# Patient Record
Sex: MALE | Race: WHITE | NOT HISPANIC OR LATINO | Employment: FULL TIME | URBAN - METROPOLITAN AREA
[De-identification: names, ages, dates, MRNs, and addresses within clinical notes are randomized per-mention and may not be internally consistent; named-entity substitution may affect disease eponyms.]

---

## 2019-07-26 ENCOUNTER — OFFICE VISIT (OUTPATIENT)
Dept: FAMILY MEDICINE | Facility: CLINIC | Age: 39
End: 2019-07-26
Payer: COMMERCIAL

## 2019-07-26 VITALS
SYSTOLIC BLOOD PRESSURE: 124 MMHG | HEART RATE: 72 BPM | BODY MASS INDEX: 36.96 KG/M2 | DIASTOLIC BLOOD PRESSURE: 94 MMHG | HEIGHT: 70 IN | TEMPERATURE: 98 F | WEIGHT: 258.19 LBS | OXYGEN SATURATION: 98 %

## 2019-07-26 DIAGNOSIS — Z98.84 HISTORY OF BARIATRIC SURGERY: ICD-10-CM

## 2019-07-26 DIAGNOSIS — R10.9 ABDOMINAL PAIN, UNSPECIFIED ABDOMINAL LOCATION: ICD-10-CM

## 2019-07-26 DIAGNOSIS — Z72.0 TOBACCO USE: Primary | ICD-10-CM

## 2019-07-26 DIAGNOSIS — K62.5 BRIGHT RED BLOOD PER RECTUM: ICD-10-CM

## 2019-07-26 DIAGNOSIS — Z87.39 PERSONAL HISTORY OF GOUT: ICD-10-CM

## 2019-07-26 DIAGNOSIS — R68.82 DECREASED LIBIDO: ICD-10-CM

## 2019-07-26 DIAGNOSIS — R12 HEARTBURN: ICD-10-CM

## 2019-07-26 DIAGNOSIS — F10.20 ALCOHOLISM: ICD-10-CM

## 2019-07-26 PROCEDURE — 99999 PR PBB SHADOW E&M-EST. PATIENT-LVL V: CPT | Mod: PBBFAC,,, | Performed by: FAMILY MEDICINE

## 2019-07-26 PROCEDURE — 99204 PR OFFICE/OUTPT VISIT, NEW, LEVL IV, 45-59 MIN: ICD-10-PCS | Mod: S$GLB,,, | Performed by: FAMILY MEDICINE

## 2019-07-26 PROCEDURE — 99204 OFFICE O/P NEW MOD 45 MIN: CPT | Mod: S$GLB,,, | Performed by: FAMILY MEDICINE

## 2019-07-26 PROCEDURE — 99999 PR PBB SHADOW E&M-EST. PATIENT-LVL V: ICD-10-PCS | Mod: PBBFAC,,, | Performed by: FAMILY MEDICINE

## 2019-07-26 RX ORDER — PANTOPRAZOLE SODIUM 40 MG/1
40 TABLET, DELAYED RELEASE ORAL DAILY
Qty: 30 TABLET | Refills: 11 | Status: ON HOLD | OUTPATIENT
Start: 2019-07-26 | End: 2020-07-25

## 2019-07-26 RX ORDER — VARENICLINE TARTRATE 0.5 (11)-1
KIT ORAL
Qty: 1 PACKAGE | Refills: 0 | Status: SHIPPED | OUTPATIENT
Start: 2019-07-26 | End: 2020-10-06

## 2019-07-26 NOTE — PROGRESS NOTES
Assessment and Plan:    1. Tobacco use  - varenicline (CHANTIX STARTING MONTH BOX) 0.5 mg (11)- 1 mg (42) tablet; Take one 0.5mg tab by mouth once daily X3 days,then increase to one 0.5mg tab twice daily X4 days,then increase to one 1mg tab twice daily  Dispense: 1 Package; Refill: 0    2. Heartburn  The history of heavy alcohol use, tobacco use and gastric sleeve, will recommend for upper endoscopy.  Discussed heavy alcohol use and cutting back.  - pantoprazole (PROTONIX) 40 MG tablet; Take 1 tablet (40 mg total) by mouth once daily.  Dispense: 30 tablet; Refill: 11  - Case request GI: COLONOSCOPY    3. History of bariatric surgery  - Urinalysis; Future  - Vitamin D; Future  - Vitamin B12; Future  - Ferritin; Future    4. Bright red blood per rectum  Has GI appointment on August 8th  - CBC auto differential; Future  - Case request GI: COLONOSCOPY    5. Abdominal pain, unspecified abdominal location  - Comprehensive metabolic panel; Future  - Lipid panel; Future  - US Abdomen Complete; Future    6. Alcoholism  Discussed multiple mechanisms of helping patient cut back.  He will try and cut back on his own.  - Magnesium; Future  - VITAMIN B1; Future    7. Personal history of gout  Recent flares likely secondary to alcohol use  - Uric acid; Future    8. Decreased libido  - Testosterone; Future        ______________________________________________________________________  Subjective:    Chief Complaint:  Chief Complaint   Patient presents with    Establish Care    Rectal Bleeding     Comes and goes past 6 months-- light to bold red blood    eating issues     Gastric sleeve 01/2013    cramping in abdomen     ribcage and midback    help to stop smoking     Would like Chantix    Heartburn     prescription for Prilosec        HPI:  Seymour is a 39 y.o. year old     Rectal bleeding  Quantity of blood varies from none to whole bowl redness. Frequency decreasing. No personal or family history of colon cancer or bleeding  "diathesis.     Abdominal discomfort, decreased appetite  "Can't eat or drink at the same time". Excessive belching. Has upcoming appt with GI on Aug 9. Reports bilateral flank cramping. He attributes this to dehydration. Will cluster for several days.     History of bariatric surgery, gastric sleeve January 2013.  Initially lost wt and has gained wt back (40 lbs over 2 years). Has poor diet. Not taking Vitamins.     Alcoholism  Drinking 3 pints of whiskey weekly, down from 1 pint daily.  No history of DUI, trouble with alcohol at work.  Uses alcohol to cope with stress at work.  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    Heartburn  Alcoholism + Tobacco Use + Gastric Sleeve    Fatigue  Patient enquiring on testosterone levels.  Reports decreased libido.    Tobacco use  Down to 2-3 cigs on days he does not drink and 1 pack when he does.     Gout  Dx at 20 years old. Last flare 4 months ago.     Past Medical History:  Past Medical History:   Diagnosis Date    Gout     History of bariatric surgery     Obesity     Tobacco use        Past Surgical History:  Past Surgical History:   Procedure Laterality Date    APPENDECTOMY      2012    Gastric sleeve  01/2013    VASECTOMY         Family History:  History reviewed. No pertinent family history.    Social History:  Social History     Socioeconomic History    Marital status: Single     Spouse name: Not on file    Number of children: Not on file    Years of education: Not on file    Highest education level: Not on file   Occupational History    Not on file   Social Needs    Financial resource strain: Not on file    Food insecurity:     Worry: Not on file     Inability: Not on file    Transportation needs:     Medical: Not on file     Non-medical: Not on file   Tobacco Use    Smoking status: Current Every Day Smoker    Smokeless tobacco: Never Used   Substance and Sexual Activity    Alcohol use: Not on " "file    Drug use: Not on file    Sexual activity: Not on file   Lifestyle    Physical activity:     Days per week: Not on file     Minutes per session: Not on file    Stress: Not on file   Relationships    Social connections:     Talks on phone: Not on file     Gets together: Not on file     Attends Oriental orthodox service: Not on file     Active member of club or organization: Not on file     Attends meetings of clubs or organizations: Not on file     Relationship status: Not on file   Other Topics Concern    Not on file   Social History Narrative    Not on file       Medications:  No current outpatient medications on file prior to visit.     No current facility-administered medications on file prior to visit.        Allergies:  Patient has no known allergies.    Immunizations:    There is no immunization history on file for this patient.    Review of Systems:  Review of Systems   Constitutional: Positive for appetite change. Negative for fever.   Respiratory: Negative for cough and shortness of breath.    Cardiovascular: Negative for chest pain.   Gastrointestinal: Positive for abdominal pain. Negative for diarrhea, nausea and vomiting.        Bright red blood per rectum  Heartburn   Skin: Negative for rash.   Psychiatric/Behavioral: Negative for dysphoric mood.       Objective:    Vitals:  Vitals:    07/26/19 0904   BP: (!) 124/94   Pulse: 72   Temp: 97.8 °F (36.6 °C)   TempSrc: Oral   SpO2: 98%   Weight: 117.1 kg (258 lb 2.5 oz)   Height: 5' 10" (1.778 m)       Physical Exam   Constitutional: No distress.   HENT:   Head: Normocephalic and atraumatic.   Eyes: Pupils are equal, round, and reactive to light. EOM are normal.   Neck: Neck supple.   Cardiovascular: Normal rate and regular rhythm. Exam reveals no friction rub.   No murmur heard.  Pulmonary/Chest: Effort normal and breath sounds normal.   Abdominal: Soft. Bowel sounds are normal. He exhibits no distension. There is no tenderness.   Mildly distended " abdomen.  Questionable liver enlargement on exam.  No caput medusa or spider telangiectasia.   Skin: Skin is warm and dry. No rash noted.        Psychiatric: He has a normal mood and affect. His behavior is normal.       Data:  Previous Notes reviewed and pertinent for Surgical procedures.        Rob Jovel MD  Family Medicine

## 2019-07-30 ENCOUNTER — HOSPITAL ENCOUNTER (OUTPATIENT)
Dept: RADIOLOGY | Facility: HOSPITAL | Age: 39
Discharge: HOME | End: 2019-07-30
Attending: FAMILY MEDICINE
Payer: COMMERCIAL

## 2019-07-30 DIAGNOSIS — R10.9 ABDOMINAL PAIN, UNSPECIFIED ABDOMINAL LOCATION: ICD-10-CM

## 2019-07-30 PROCEDURE — 76700 US ABDOMEN COMPLETE: ICD-10-PCS | Mod: 26,,, | Performed by: RADIOLOGY

## 2019-07-30 PROCEDURE — 76700 US EXAM ABDOM COMPLETE: CPT | Mod: 26,,, | Performed by: RADIOLOGY

## 2019-07-30 PROCEDURE — 76700 US EXAM ABDOM COMPLETE: CPT | Mod: TC,PO

## 2019-07-31 DIAGNOSIS — K76.0 FATTY LIVER: Primary | ICD-10-CM

## 2019-07-31 RX ORDER — ATORVASTATIN CALCIUM 40 MG/1
40 TABLET, FILM COATED ORAL DAILY
Qty: 90 TABLET | Refills: 3 | Status: SHIPPED | OUTPATIENT
Start: 2019-07-31 | End: 2020-10-06

## 2019-08-09 ENCOUNTER — OFFICE VISIT (OUTPATIENT)
Dept: GASTROENTEROLOGY | Facility: CLINIC | Age: 39
End: 2019-08-09
Payer: COMMERCIAL

## 2019-08-09 VITALS
DIASTOLIC BLOOD PRESSURE: 87 MMHG | BODY MASS INDEX: 37.07 KG/M2 | WEIGHT: 258.38 LBS | SYSTOLIC BLOOD PRESSURE: 132 MMHG

## 2019-08-09 DIAGNOSIS — R16.0 HEPATOMEGALY: ICD-10-CM

## 2019-08-09 DIAGNOSIS — Z87.19 HISTORY OF GASTROESOPHAGEAL REFLUX (GERD): ICD-10-CM

## 2019-08-09 DIAGNOSIS — F10.10 ALCOHOL ABUSE: ICD-10-CM

## 2019-08-09 DIAGNOSIS — K92.1 HEMATOCHEZIA: Primary | ICD-10-CM

## 2019-08-09 DIAGNOSIS — Z90.3 HISTORY OF SLEEVE GASTRECTOMY: ICD-10-CM

## 2019-08-09 DIAGNOSIS — R93.2 ABNORMAL GALLBLADDER ULTRASOUND: ICD-10-CM

## 2019-08-09 DIAGNOSIS — K76.0 FATTY LIVER: ICD-10-CM

## 2019-08-09 DIAGNOSIS — R79.89 ELEVATED LFTS: ICD-10-CM

## 2019-08-09 DIAGNOSIS — R13.14 PHARYNGOESOPHAGEAL DYSPHAGIA: ICD-10-CM

## 2019-08-09 PROCEDURE — 99999 PR PBB SHADOW E&M-EST. PATIENT-LVL IV: ICD-10-PCS | Mod: PBBFAC,,, | Performed by: NURSE PRACTITIONER

## 2019-08-09 PROCEDURE — 99999 PR PBB SHADOW E&M-EST. PATIENT-LVL IV: CPT | Mod: PBBFAC,,, | Performed by: NURSE PRACTITIONER

## 2019-08-09 PROCEDURE — 99243 PR OFFICE CONSULTATION,LEVEL III: ICD-10-PCS | Mod: S$GLB,,, | Performed by: NURSE PRACTITIONER

## 2019-08-09 PROCEDURE — 99243 OFF/OP CNSLTJ NEW/EST LOW 30: CPT | Mod: S$GLB,,, | Performed by: NURSE PRACTITIONER

## 2019-08-09 RX ORDER — PREDNISONE 20 MG/1
TABLET ORAL
Refills: 0 | COMMUNITY
Start: 2019-05-17 | End: 2020-10-06

## 2019-08-09 NOTE — PATIENT INSTRUCTIONS
"GERD (Adult)    The esophagus is a tube that carries food from the mouth to the stomach. A valve at the lower end of the esophagus prevents stomach acid from flowing upward. When this valve doesn't work properly, stomach contents may repeatedly flow back up (reflux) into the esophagus. This is called gastroesophageal reflux disease (GERD). GERD can irritate the esophagus. It can cause problems with swallowing or breathing. In severe cases, GERD can cause recurrent pneumonia or other serious problems.  Symptoms of reflux include burning, pressure or sharp pain in the upper abdomen or mid to lower chest. The pain can spread to the neck, back, or shoulder. There may be belching, an acid taste in the back of the throat, chronic cough, or sore throat or hoarseness. GERD symptoms often occur during the day after a big meal. They can also occur at night when lying down.   Home care  Lifestyle changes can help reduce symptoms. If needed, medicines may be prescribed. Symptoms often improve with treatment, but if treatment is stopped, the symptoms often return after a few months. So most persons with GERD will need to continue treatment.  Lifestyle changes  · Limit or avoid fatty, fried, and spicy foods, as well as coffee, chocolate, mint, and foods with high acid content such as tomatoes and citrus fruit and juices (orange, grapefruit, lemon).  · Dont eat large meals, especially at night. Frequent, smaller meals are best. Do not lie down right after eating. And dont eat anything 3 hours before going to bed.  · Avoid drinking alcohol and smoking. As much as possible, stay away from second hand smoke.  · If you are overweight, losing weight will reduce symptoms.   · Avoid wearing tight clothing around your stomach area.  · If your symptoms occur during sleep, use a foam wedge to elevate your upper body (not just your head.) Or, place 4" blocks under the head of your bed.  Medicines  If needed, medicines can help relieve the " symptoms of GERD and prevent damage to the esophagus. Discuss a medicine plan with your healthcare provider. This may include one or more of the following medicines:  · Antacids to help neutralize the normal acids in your stomach.  · Acid blockers (H2 blockers) to decrease acid production.  · Acid inhibitors (PPIs) to decrease acid production in a different way than the blockers. They may work better, but can take a little longer to take effect.  Take an antacid 30-60 minutes after eating and at bedtime, but not at the same time as an acid blocker.  Try not to take medicines such as ibuprofen and aspirin. If you are taking aspirin for your heart or other medical reasons, talk to your healthcare provider about stopping it.  Follow-up care  Follow up with your healthcare provider or as advised by our staff.  When to seek medical advice  Call your healthcare provider if any of the following occur:  · Stomach pain gets worse or moves to the lower right abdomen (appendix area)  · Chest pain appears or gets worse, or spreads to the back, neck, shoulder, or arm  · Frequent vomiting (cant keep down liquids)  · Blood in the stool or vomit (red or black in color)  · Feeling weak or dizzy  · Fever of 100.4ºF (38ºC) or higher, or as directed by your healthcare provider  Date Last Reviewed: 6/23/2015 © 2000-2017 [employer]. [address] [place] [address], [place] [address]. All rights reserved. This information is not intended as a substitute for professional medical care. Always follow your healthcare professional's instructions.          Discharge Instructions: Eating a Soft Diet  You have been prescribed a soft diet (also called gastrointestinal soft diet or bland diet). This reduces the amount of work your digestive tract has to do. It also reduces the chance that your digestive tract will be irritated by the food you eat. A soft diet is prescribed for people with digestive problems. The diet consists of foods that  are tender, mildly seasoned, and easy to digest. While on this diet, you should not eat fried or spicy foods, or raw fruits and vegetables. Also avoid alcoholic beverages.  General guidelines  · Eat in a calm, relaxed atmosphere. How you eat may be as important as what you eat. Dont rush while eating. Chew your food slowly and thoroughly, and swallow slowly.  · Eat small frequent meals throughout the day, but dont eat within 2 hours of bedtime.  · Avoid any foods that cause discomfort.  · Dont use NSAIDs (nonsteroidal anti-inflammatory drugs), such as aspirin, and ibuprofen. Also avoid medicine that contain aspirin. NSAIDs can cause ulcers and delay or prevent ulcer healing.  · Use antacids as needed, but keep in mind that magnesium-containing antacids may cause diarrhea.  Foods to eat  · Cream of wheat and cream of rice  · Cooked white rice  · Mashed potatoes, and boiled potatoes without skin  · Plain pasta and noodles  · Plain white crackers (such as no-salt soda crackers)  · White bread  · Applesauce  · Cooked fruits without skins or seeds  · Mild juices, such as apple and grape  · Bananas  · Cooked or mashed vegetables without stems and seeds  ? Carrots  ? Summer squash (zucchini, yellow squash)  ? Winter squash (acorn, butternut, spaghetti squash)  · Cottage cheese  · Mild hard or soft cheeses  · Custard  · Yogurt without seeds or nuts  · Milk (you may need lactose-free milk)  · Ice cream without seeds or nuts  · Smooth peanut butter  · Eggs  · Fish, turkey, chicken, or other meat that is not tough or stringy  · Tofu  Foods to avoid  · Nuts and seeds  · Snack foods, such as the following:  ? Chocolate-containing snacks, candy, pastries, or cakes.  ? Potato chips (plain, barbecued, or other flavors)  ? Taco chips or nachos  ? Corn chips  ? Popcorn, popcorn cakes, or rice cakes  ? Crackers with nuts, seeds, or spicy seasonings  ? French fries  · Fried or greasy foods  · Whole-grain breads, rolls, and  crackers  · Breads and rolls with nuts, seeds, or bran  · Bran and granola cereals  · Berries with seeds, such as strawberries, raspberries, and blackberries  · Acidic fruits, such as oranges, grapefruits, rj, limes, and pineapples  · Raw vegetables  · Mild or hot peppers  · Sauerkraut and pickled vegetables  · Tomatoes or tomato products, such as tomato paste, tomato sauce, and tomato juice  · Barbecue sauce  · Spicy or flavored cheeses, such as jalapeño and black pepper cheese  · Crunchy peanut butter  · Dried cooked beans, such as álvarez, kidney, or navy beans  · The following meats:  ? Fried or greasy meats  ? Processed, spicy meats, such as sausage, silva, ham, and lunch meats  ? Ribs and other meats with barbecue sauce  ? Tough or stringy meats, such as corned beef or beef jerky  Fluids to avoid  · Alcoholic beverages  · Coffee and regular teas  · Yossi and other drinks with caffeine  · Cranberry, orange, pineapple, and grapefruit juice  · Lemonade  · Vegetable juice  · Whole milk, if you are lactose intolerant  Follow-up  Make a follow-up appointment with a dietitian as directed by our staff.  Date Last Reviewed: 6/21/2015 © 2000-2017 [employer]. [address] [place] [address]. All rights reserved. This information is not intended as a substitute for professional medical care. Always follow your healthcare professional's instructions.

## 2019-08-09 NOTE — PROGRESS NOTES
Subjective:       Patient ID: Seymour Park is a 39 y.o. male Body mass index is 37.07 kg/m².    Chief Complaint: GI Problem (rectal bleeding & dysphagia)    This patient is new to me.  Referred by Dr. Jovel for rectal bleeding & colonoscopy.    GI Problem   The primary symptoms include hematochezia. Primary symptoms do not include fever, weight loss, fatigue, abdominal pain, nausea, vomiting, diarrhea, melena or dysuria.   The hematochezia began more than 1 week ago. Frequency: intermittent; has been several weeks since last episode; small to moderate amount of bright red blood in bowl & on tissue; denies bleeding in between bowel movements. The hematochezia is a recurrent (intermittent over the past few years) problem.   The illness is also significant for dysphagia (since sleeve can't eat and drink at the same time, feels like food stays in esophagus (can't have soup since it is solid and liquid together)). The illness does not include chills, odynophagia or constipation. Associated symptoms comments: Bowel movements once daily of formed stool. Significant associated medical issues include GERD (controlled on protonix 40 mg daily started ~7/26/19; if he misses a dose he has GERD; PAST: prilosec), gallstones, liver disease (fatty enlarged liver) and alcohol abuse. Associated medical issues do not include inflammatory bowel disease.     Review of Systems   Constitutional: Negative for appetite change, chills, fatigue, fever and weight loss.   HENT: Positive for trouble swallowing. Negative for sore throat.    Respiratory: Negative for cough, choking and shortness of breath.    Cardiovascular: Negative for chest pain.   Gastrointestinal: Positive for anal bleeding, dysphagia (since sleeve can't eat and drink at the same time, feels like food stays in esophagus (can't have soup since it is solid and liquid together)) and hematochezia. Negative for abdominal pain, blood in stool, constipation, diarrhea, melena, nausea,  rectal pain and vomiting.   Genitourinary: Negative for difficulty urinating, dysuria and flank pain.   Neurological: Negative for weakness.       Past Medical History:   Diagnosis Date    Cholelithiasis     Fatty liver     Gout     Hepatomegaly     History of bariatric surgery     Obesity     Tobacco use      Past Surgical History:   Procedure Laterality Date    APPENDECTOMY  ~    Gastric sleeve  2013    VASECTOMY       Family History   Problem Relation Age of Onset    Colon cancer Neg Hx     Crohn's disease Neg Hx     Esophageal cancer Neg Hx     Stomach cancer Neg Hx     Liver cancer Neg Hx     Liver disease Neg Hx       Social History     Tobacco Use    Smoking status: Former Smoker     Types: Cigarettes     Last attempt to quit: 2019     Years since quittin.0    Smokeless tobacco: Never Used   Substance Use Topics    Alcohol use: Not Currently     Comment: 3 pints per week - whiskey; stopped drinking ~19    Drug use: Not Currently     Comment: marijuana in college      Wt Readings from Last 10 Encounters:   19 117.2 kg (258 lb 6.1 oz)   19 117.1 kg (258 lb 2.5 oz)     Lab Results   Component Value Date    WBC 6.05 2019    HGB 15.3 2019    HCT 46.3 2019    MCV 83 2019     2019     CMP  Sodium   Date Value Ref Range Status   2019 140 136 - 145 mmol/L Final     Potassium   Date Value Ref Range Status   2019 4.7 3.5 - 5.1 mmol/L Final     Chloride   Date Value Ref Range Status   2019 105 95 - 110 mmol/L Final     CO2   Date Value Ref Range Status   2019 29 23 - 29 mmol/L Final     Glucose   Date Value Ref Range Status   2019 100 70 - 110 mg/dL Final     BUN, Bld   Date Value Ref Range Status   2019 15 6 - 20 mg/dL Final     Creatinine   Date Value Ref Range Status   2019 1.0 0.5 - 1.4 mg/dL Final     Calcium   Date Value Ref Range Status   2019 9.8 8.7 - 10.5 mg/dL Final      Total Protein   Date Value Ref Range Status   07/30/2019 7.5 6.0 - 8.4 g/dL Final     Albumin   Date Value Ref Range Status   07/30/2019 3.9 3.5 - 5.2 g/dL Final     Total Bilirubin   Date Value Ref Range Status   07/30/2019 0.8 0.1 - 1.0 mg/dL Final     Comment:     For infants and newborns, interpretation of results should be based  on gestational age, weight and in agreement with clinical  observations.  Premature Infant recommended reference ranges:  Up to 24 hours.............<8.0 mg/dL  Up to 48 hours............<12.0 mg/dL  3-5 days..................<15.0 mg/dL  6-29 days.................<15.0 mg/dL       Alkaline Phosphatase   Date Value Ref Range Status   07/30/2019 90 55 - 135 U/L Final     AST   Date Value Ref Range Status   07/30/2019 84 (H) 10 - 40 U/L Final     ALT   Date Value Ref Range Status   07/30/2019 160 (H) 10 - 44 U/L Final     Anion Gap   Date Value Ref Range Status   07/30/2019 6 (L) 8 - 16 mmol/L Final     eGFR if    Date Value Ref Range Status   07/30/2019 >60.0 >60 mL/min/1.73 m^2 Final     eGFR if non    Date Value Ref Range Status   07/30/2019 >60.0 >60 mL/min/1.73 m^2 Final     Comment:     Calculation used to obtain the estimated glomerular filtration  rate (eGFR) is the CKD-EPI equation.        Reviewed prior medical records including radiology report of 7/30/19 abdominal ultrasound.    Objective:      Physical Exam   Constitutional: He is oriented to person, place, and time. He appears well-developed and well-nourished. No distress.   HENT:   Mouth/Throat: Oropharynx is clear and moist and mucous membranes are normal. No oral lesions. No oropharyngeal exudate.   Eyes: Pupils are equal, round, and reactive to light. Conjunctivae are normal. No scleral icterus.   Pulmonary/Chest: Effort normal and breath sounds normal. No respiratory distress. He has no wheezes.   Abdominal: Soft. Normal appearance and bowel sounds are normal. He exhibits no  distension, no abdominal bruit and no mass. There is no tenderness. There is no rigidity, no rebound, no guarding, no tenderness at McBurney's point and negative Dejesus's sign.   Well-healed surgical scars noted. Patient declined rectal exam.   Neurological: He is alert and oriented to person, place, and time.   Skin: Skin is warm and dry. No rash noted. He is not diaphoretic. No erythema. No pallor.   Non-jaundiced   Psychiatric: He has a normal mood and affect. His behavior is normal. Judgment and thought content normal.   Nursing note and vitals reviewed.      Assessment:       1. Hematochezia    2. Pharyngoesophageal dysphagia    3. History of sleeve gastrectomy    4. Elevated LFTs    5. Alcohol abuse    6. Fatty liver    7. Hepatomegaly    8. Abnormal gallbladder ultrasound        Plan:       Hematochezia  - discussed about different etiologies that can cause rectal bleeding, such as but not limited to diverticulosis, polyps, colon mass, colon inflammation or infection, anal fissure or hemorrhoids.   - schedule Colonoscopy, discussed procedure with the patient, verbalized understanding   - You may resume normal activity as long as you feel well.  - Avoid/minimize aspirin and anti-inflammatory drugs such as ibuprofen (Advil, Motrin) and naproxen (Aleve and Naprosyn).  - Avoid alcohol.    Pharyngoesophageal dysphagia  - schedule EGD, discussed procedure with patient and possible esophageal dilation may be performed during procedure if indicated, patient verbalized understanding  - educated patient to eat smaller more frequent meals and to eat slowly and advised to eat a soft diet.  - possible UGI/esophagram/esophageal manometry if symptoms persist    History of GERD  - CONTINUE PROTONIX 40 MG DAILY AS DIRECTED, take in the morning 30-60 minutes before breakfast, discussed about possible long term use of medication (prefer to use lowest effective dose or discontinuing if possible) and discussed the risks &  benefits with taking a reflux medication long term, and to take OTC calcium and vitamin d supplements as directed (such as Citracal +D), pt verbalized understanding  -Educated patient on lifestyle modifications to help control/reduce reflux/abdominal pain including: avoid large meals, avoid eating within 2-3 hours of bedtime (avoid late night eating & lying down soon after eating), elevate head of bed if nocturnal symptoms are present, smoking cessation (if current smoker), & weight loss (if overweight).   -Educated to avoid known foods which trigger reflux symptoms & to minimize/avoid high-fat foods, chocolate, caffeine, citrus, alcohol, & tomato products.  -Advised to avoid/limit use of NSAID's, since they can cause GI upset, bleeding, and/or ulcers. If needed, take with food.   - schedule EGD, discussed procedure with patient, including risks and benefits, patient verbalized understanding    History of sleeve gastrectomy  - schedule EGD, discussed procedure with patient, including risks and benefits, patient verbalized understanding    Elevated LFTs, Alcohol abuse, Hepatomegaly, & Fatty liver  For fatty liver recommend: low fat, low cholesterol diet, maintain good control of blood sugars and cholesterol levels, exercise, weight loss (if overweight), minimize/avoid alcohol and tylenol products, & follow-up with PCP for continued evaluation and management; if specialist is needed, recommend seeing hepatology.    Abnormal gallbladder ultrasound  - recommend low fat diet  - recommend seeing general surgery for further evaluation and management, patient verbalized understanding    Follow up in about 1 month (around 9/9/2019), or if symptoms worsen or fail to improve.      If no improvement in symptoms or symptoms worsen, call/follow-up at clinic or go to ER.

## 2019-08-09 NOTE — LETTER
August 14, 2019        Rob Jovel MD  [address]  [place] [address]             [facility]  [address]  [facility] [address]  Phone: [phone]   Patient: Seymour Park   MR Number: [id number]   YOB: 1980   Date of Visit: 8/9/2019       Dear Dr. Jovel:    Thank you for referring Seymour Park to me for evaluation. Below are the relevant portions of my assessment and plan of care.        1. Hematochezia    2. Pharyngoesophageal dysphagia    3. History of sleeve gastrectomy    4. Elevated LFTs    5. Alcohol abuse    6. Fatty liver    7. Hepatomegaly    8. Abnormal gallbladder ultrasound          Hematochezia    Pharyngoesophageal dysphagia    History of sleeve gastrectomy    Elevated LFTs    Alcohol abuse    Fatty liver    Hepatomegaly    Abnormal gallbladder ultrasound      Follow up in about 1 month (around 9/9/2019), or if symptoms worsen or fail to improve.      If you have questions, please do not hesitate to call me. I look forward to following Seymour along with you.    Sincerely,      Pamela Holt, LANG           CC  No Recipients

## 2019-08-15 ENCOUNTER — TELEPHONE (OUTPATIENT)
Dept: SURGERY | Facility: CLINIC | Age: 39
End: 2019-08-15

## 2019-08-15 NOTE — TELEPHONE ENCOUNTER
I called patient to schedule a consult for gallstones per LANG Santos and patient states that he'll have to wait till next year due to his insurance.  I informed him that if he starts to have pain or needs to be seen sooner to call for an appointment.  I informed him that I'd let LANG Santos know.  Mickey

## 2019-09-18 ENCOUNTER — TELEPHONE (OUTPATIENT)
Dept: GASTROENTEROLOGY | Facility: CLINIC | Age: 39
End: 2019-09-18

## 2019-09-18 NOTE — TELEPHONE ENCOUNTER
----- Message from Kiran Ndiaye sent at 9/18/2019  1:56 PM CDT -----  Contact: pt   Pt would like a call back regarding canceling appointment due to insurance reason     Pt can be reached at  [phone]

## 2020-03-03 ENCOUNTER — TELEPHONE (OUTPATIENT)
Dept: GASTROENTEROLOGY | Facility: CLINIC | Age: 40
End: 2020-03-03

## 2020-03-03 NOTE — TELEPHONE ENCOUNTER
----- Message from Luis Carlos Rachel sent at 3/3/2020  8:27 AM CST -----  Contact: pt wife China  Type:  Sooner Apoointment Request    Caller is requesting a sooner appointment.  Caller declined first available appointment listed below.  Caller will not accept being placed on the waitlist and is requesting a message be sent to doctor.    Name of Caller:  China    Luther Call Back Number:  [phone]  Additional Information:  Pt was to schedule an endoscopy and colonoscopy back in October but did not. Pt is now requesting to get this scheduled. Please call to advise

## 2020-03-03 NOTE — TELEPHONE ENCOUNTER
Scheduled colonoscopy and EGD, mailed confirmation and instructions to address on file. Pt verbalized understanding.

## 2020-05-07 DIAGNOSIS — Z01.812 PRE-PROCEDURE LAB EXAM: Primary | ICD-10-CM

## 2020-05-08 ENCOUNTER — TELEPHONE (OUTPATIENT)
Dept: GASTROENTEROLOGY | Facility: CLINIC | Age: 40
End: 2020-05-08

## 2020-05-08 NOTE — TELEPHONE ENCOUNTER
Called to inform patient that he is scheduled for this COVID test. Pt states that he is going back to work on the date of his procedure and will need to reschedule. Pt informed that he will receive a call from the office to reschedule. Pt verbalized understanding.

## 2020-05-12 ENCOUNTER — TELEPHONE (OUTPATIENT)
Dept: GASTROENTEROLOGY | Facility: CLINIC | Age: 40
End: 2020-05-12

## 2020-05-12 NOTE — TELEPHONE ENCOUNTER
R/s colonoscopy and EGD, mailed confirmation and instructions to address on file. Pt verbalized understanding.

## 2020-06-16 ENCOUNTER — TELEPHONE (OUTPATIENT)
Dept: GASTROENTEROLOGY | Facility: CLINIC | Age: 40
End: 2020-06-16

## 2020-06-16 NOTE — TELEPHONE ENCOUNTER
----- Message from Elizabeth Camilo LPN sent at 6/15/2020  5:00 PM CDT -----  Regarding: FW: Requesting call back    ----- Message -----  From: Lexus Jimenez  Sent: 6/15/2020   4:19 PM CDT  To: Jeffrey MCCANN Jr. Staff  Subject: Requesting call back                             Type: Needs medical advice      Who Called: Pt wife China Sloan Call Back Number:   [phone]  Additional Information:  Pt wife stated pt want to r/s his COLONOSCOPY to sometime in July. Please advise

## 2020-06-16 NOTE — TELEPHONE ENCOUNTER
Rescheduled pt EGD and colonoscopy, mailed confirmation and instructions to address on file. Pt's wife verbalized understanding.

## 2020-08-06 DIAGNOSIS — Z01.812 PRE-PROCEDURE LAB EXAM: ICD-10-CM

## 2020-08-10 ENCOUNTER — TELEPHONE (OUTPATIENT)
Dept: GASTROENTEROLOGY | Facility: CLINIC | Age: 40
End: 2020-08-10

## 2020-08-11 ENCOUNTER — LAB VISIT (OUTPATIENT)
Dept: FAMILY MEDICINE | Facility: CLINIC | Age: 40
End: 2020-08-11
Payer: COMMERCIAL

## 2020-08-11 DIAGNOSIS — Z01.812 PRE-PROCEDURE LAB EXAM: ICD-10-CM

## 2020-08-11 PROCEDURE — U0003 INFECTIOUS AGENT DETECTION BY NUCLEIC ACID (DNA OR RNA); SEVERE ACUTE RESPIRATORY SYNDROME CORONAVIRUS 2 (SARS-COV-2) (CORONAVIRUS DISEASE [COVID-19]), AMPLIFIED PROBE TECHNIQUE, MAKING USE OF HIGH THROUGHPUT TECHNOLOGIES AS DESCRIBED BY CMS-2020-01-R: HCPCS

## 2020-08-11 NOTE — H&P
History & Physical - Short Stay  Gastroenterology      SUBJECTIVE:     Procedure: Gastroscopy and Colonoscopy    Chief Complaint/Indication for Procedure:  Dysphagia.  Hematochezia.    History of Present Illness:  Office Visit    8/9/2019  [facility]     LANG Gutierrez  Gastroenterology  Hematochezia +8 more  Dx  GI Problem   ; Referred by Aaareferral Self  Reason for Visit   Progress Notes  LANG Gutierrez (Nurse Practitioner)   Gastroenterology   8/9/2019 11:30 AM   Signed     Subjective:       Patient ID: Seymour Park is a 39 y.o. male Body mass index is 37.07 kg/m².     Chief Complaint: GI Problem (rectal bleeding & dysphagia)    This patient is new to me.  Referred by Dr. Jovel for rectal bleeding & colonoscopy.     GI Problem   The primary symptoms include hematochezia. Primary symptoms do not include fever, weight loss, fatigue, abdominal pain, nausea, vomiting, diarrhea, melena or dysuria.   The hematochezia began more than 1 week ago. Frequency: intermittent; has been several weeks since last episode; small to moderate amount of bright red blood in bowl & on tissue; denies bleeding in between bowel movements. The hematochezia is a recurrent (intermittent over the past few years) problem.   The illness is also significant for dysphagia (since sleeve can't eat and drink at the same time, feels like food stays in esophagus (can't have soup since it is solid and liquid together)). The illness does not include chills, odynophagia or constipation. Associated symptoms comments: Bowel movements once daily of formed stool. Significant associated medical issues include GERD (controlled on protonix 40 mg daily started ~7/26/19; if he misses a dose he has GERD; PAST: prilosec), gallstones, liver disease (fatty enlarged liver) and alcohol abuse. Associated medical issues do not include inflammatory bowel disease.      Review of Systems   Constitutional: Negative for appetite  change, chills, fatigue, fever and weight loss.   HENT: Positive for trouble swallowing. Negative for sore throat.    Respiratory: Negative for cough, choking and shortness of breath.    Cardiovascular: Negative for chest pain.   Gastrointestinal: Positive for anal bleeding, dysphagia (since sleeve can't eat and drink at the same time, feels like food stays in esophagus (can't have soup since it is solid and liquid together)) and hematochezia. Negative for abdominal pain, blood in stool, constipation, diarrhea, melena, nausea, rectal pain and vomiting.     Assessment:       1. Hematochezia    2. Pharyngoesophageal dysphagia    3. History of sleeve gastrectomy    4. Elevated LFTs    5. Alcohol abuse    6. Fatty liver    7. Hepatomegaly    8. Abnormal gallbladder ultrasound        Plan:       Hematochezia  - discussed about different etiologies that can cause rectal bleeding, such as but not limited to diverticulosis, polyps, colon mass, colon inflammation or infection, anal fissure or hemorrhoids.   - schedule Colonoscopy, discussed procedure with the patient, verbalized understanding   - You may resume normal activity as long as you feel well.  - Avoid/minimize aspirin and anti-inflammatory drugs such as ibuprofen (Advil, Motrin) and naproxen (Aleve and Naprosyn).  - Avoid alcohol.     Pharyngoesophageal dysphagia  - schedule EGD, discussed procedure with patient and possible esophageal dilation may be performed during procedure if indicated, patient verbalized understanding  - educated patient to eat smaller more frequent meals and to eat slowly and advised to eat a soft diet.  - possible UGI/esophagram/esophageal manometry if symptoms persist     History of GERD  - CONTINUE PROTONIX 40 MG DAILY AS DIRECTED, take in the morning 30-60 minutes before breakfast, discussed about possible long term use of medication (prefer to use lowest effective dose or discontinuing if possible) and discussed the risks & benefits  with taking a reflux medication long term, and to take OTC calcium and vitamin d supplements as directed (such as Citracal +D), pt verbalized understanding  -Educated patient on lifestyle modifications to help control/reduce reflux/abdominal pain including: avoid large meals, avoid eating within 2-3 hours of bedtime (avoid late night eating & lying down soon after eating), elevate head of bed if nocturnal symptoms are present, smoking cessation (if current smoker), & weight loss (if overweight).   -Educated to avoid known foods which trigger reflux symptoms & to minimize/avoid high-fat foods, chocolate, caffeine, citrus, alcohol, & tomato products.  -Advised to avoid/limit use of NSAID's, since they can cause GI upset, bleeding, and/or ulcers. If needed, take with food.   - schedule EGD, discussed procedure with patient, including risks and benefits, patient verbalized understanding     History of sleeve gastrectomy  - schedule EGD, discussed procedure with patient, including risks and benefits, patient verbalized understanding     Elevated LFTs, Alcohol abuse, Hepatomegaly, & Fatty liver  For fatty liver recommend: low fat, low cholesterol diet, maintain good control of blood sugars and cholesterol levels, exercise, weight loss (if overweight), minimize/avoid alcohol and tylenol products, & follow-up with PCP for continued evaluation and management; if specialist is needed, recommend seeing hepatology.     Abnormal gallbladder ultrasound  - recommend low fat diet  - recommend seeing general surgery for further evaluation and management, patient verbalized understanding     Follow up in about 1 month (around 9/9/2019), or if symptoms worsen or fail to improve.            Wt Readings from Last 20 Encounters:   08/13/20 117.9 kg (260 lb)   08/09/19 117.2 kg (258 lb 6.1 oz)   07/26/19 117.1 kg (258 lb 2.5 oz)       PTA Medications   Medication Sig    omeprazole (PRILOSEC) 20 MG capsule Take 20 mg by mouth once daily.  "   atorvastatin (LIPITOR) 40 MG tablet Take 1 tablet (40 mg total) by mouth once daily. (Patient not taking: Reported on 2020)    pantoprazole (PROTONIX) 40 MG tablet Take 1 tablet (40 mg total) by mouth once daily. (Patient not taking: Reported on 2020)    predniSONE (DELTASONE) 20 MG tablet TK 2 TS PO ONCE D FOR 3 DAYS    varenicline (CHANTIX STARTING MONTH BOX) 0.5 mg (11)- 1 mg (42) tablet Take one 0.5mg tab by mouth once daily X3 days,then increase to one 0.5mg tab twice daily X4 days,then increase to one 1mg tab twice daily (Patient not taking: Reported on 2020)       Review of patient's allergies indicates:  No Known Allergies     Past Medical History:   Diagnosis Date    Cholelithiasis     Fatty liver     Gout     Hepatomegaly     History of bariatric surgery     Obesity     Tobacco use      Past Surgical History:   Procedure Laterality Date    APPENDECTOMY  ~    Gastric sleeve  2013    VASECTOMY       Family History   Problem Relation Age of Onset    Colon cancer Neg Hx     Crohn's disease Neg Hx     Esophageal cancer Neg Hx     Stomach cancer Neg Hx     Liver cancer Neg Hx     Liver disease Neg Hx      Social History     Tobacco Use    Smoking status: Former Smoker     Types: Cigarettes     Quit date: 2019     Years since quittin.0    Smokeless tobacco: Never Used   Substance Use Topics    Alcohol use: Not Currently     Comment: 3 pints per week - whiskey; stopped drinking ~19    Drug use: Not Currently     Comment: marijuana in college          OBJECTIVE:     Vital Signs (Most Recent)  Temp: 97.5 °F (36.4 °C) (20 1120)  Pulse: 76 (20 1120)  Resp: 15 (20 1120)  BP: (!) 149/93 (20 1120)  SpO2: 98 % (20 1120)    Physical Exam:  :Ht 5' 10" (1.778 m)   Wt 117.1 kg (258 lb 2.5 oz)   BMI 37.04 kg/m²                                                          GENERAL:  Comfortable, in no acute distress.                          "        HEENT EXAM:  Nonicteric.  No adenopathy.  Oropharynx is clear.               NECK:  Supple.                                                               LUNGS:  Clear.                                                               CARDIAC:  Regular rate and rhythm.  S1, S2.  No murmur.                      ABDOMEN:  Obese.  Soft, positive bowel sounds, nontender.  No hepatosplenomegaly or masses.  No rebound or guarding.                                             EXTREMITIES:  No edema.     MENTAL STATUS:  Alert and oriented.    ASSESSMENT/PLAN:     Assessment: Dysphagia.  Hematochezia.    Plan: Gastroscopy and Colonoscopy    Anesthesia Plan:   MAC / General Anaesthesia    ASA Grade: ASA 2 - Patient with mild systemic disease with no functional limitations    MALLAMPATI SCORE: II (hard and soft palate, upper portion of tonsils anduvula visible)

## 2020-08-12 LAB — SARS-COV-2 RNA RESP QL NAA+PROBE: NOT DETECTED

## 2020-08-13 ENCOUNTER — ANESTHESIA EVENT (OUTPATIENT)
Dept: ENDOSCOPY | Facility: HOSPITAL | Age: 40
End: 2020-08-13
Payer: COMMERCIAL

## 2020-08-13 RX ORDER — OMEPRAZOLE 20 MG/1
20 CAPSULE, DELAYED RELEASE ORAL DAILY
COMMUNITY
End: 2020-10-06

## 2020-08-14 ENCOUNTER — HOSPITAL ENCOUNTER (OUTPATIENT)
Facility: HOSPITAL | Age: 40
Discharge: HOME | End: 2020-08-14
Attending: INTERNAL MEDICINE | Admitting: INTERNAL MEDICINE
Payer: COMMERCIAL

## 2020-08-14 ENCOUNTER — ANESTHESIA (OUTPATIENT)
Dept: ENDOSCOPY | Facility: HOSPITAL | Age: 40
End: 2020-08-14
Payer: COMMERCIAL

## 2020-08-14 DIAGNOSIS — R12 HEARTBURN: ICD-10-CM

## 2020-08-14 DIAGNOSIS — R13.10 DYSPHAGIA: ICD-10-CM

## 2020-08-14 LAB — H PYLORI INDEX VALUE: NEGATIVE

## 2020-08-14 PROCEDURE — 27201089 HC SNARE, DISP (ANY): Mod: PO | Performed by: INTERNAL MEDICINE

## 2020-08-14 PROCEDURE — 88305 TISSUE EXAM BY PATHOLOGIST: ICD-10-PCS | Mod: 26,,, | Performed by: PATHOLOGY

## 2020-08-14 PROCEDURE — 37000009 HC ANESTHESIA EA ADD 15 MINS: Mod: PO | Performed by: INTERNAL MEDICINE

## 2020-08-14 PROCEDURE — D9220A PRA ANESTHESIA: Mod: CRNA,,, | Performed by: NURSE ANESTHETIST, CERTIFIED REGISTERED

## 2020-08-14 PROCEDURE — 37000008 HC ANESTHESIA 1ST 15 MINUTES: Mod: PO | Performed by: INTERNAL MEDICINE

## 2020-08-14 PROCEDURE — 45385 COLONOSCOPY W/LESION REMOVAL: CPT | Mod: ,,, | Performed by: INTERNAL MEDICINE

## 2020-08-14 PROCEDURE — 25000003 PHARM REV CODE 250: Mod: PO | Performed by: NURSE ANESTHETIST, CERTIFIED REGISTERED

## 2020-08-14 PROCEDURE — 27201012 HC FORCEPS, HOT/COLD, DISP: Mod: PO | Performed by: INTERNAL MEDICINE

## 2020-08-14 PROCEDURE — 88342 IMHCHEM/IMCYTCHM 1ST ANTB: CPT | Performed by: PATHOLOGY

## 2020-08-14 PROCEDURE — 88305 TISSUE EXAM BY PATHOLOGIST: CPT | Performed by: PATHOLOGY

## 2020-08-14 PROCEDURE — 43239 EGD BIOPSY SINGLE/MULTIPLE: CPT | Mod: PO | Performed by: INTERNAL MEDICINE

## 2020-08-14 PROCEDURE — 43239 EGD BIOPSY SINGLE/MULTIPLE: CPT | Mod: 59,,, | Performed by: INTERNAL MEDICINE

## 2020-08-14 PROCEDURE — 88342 IMHCHEM/IMCYTCHM 1ST ANTB: CPT | Mod: 26,,, | Performed by: PATHOLOGY

## 2020-08-14 PROCEDURE — 63600175 PHARM REV CODE 636 W HCPCS: Mod: PO | Performed by: INTERNAL MEDICINE

## 2020-08-14 PROCEDURE — 45385 PR COLONOSCOPY,REMV LESN,SNARE: ICD-10-PCS | Mod: ,,, | Performed by: INTERNAL MEDICINE

## 2020-08-14 PROCEDURE — 43239 PR EGD, FLEX, W/BIOPSY, SGL/MULTI: ICD-10-PCS | Mod: 59,,, | Performed by: INTERNAL MEDICINE

## 2020-08-14 PROCEDURE — 87449 NOS EACH ORGANISM AG IA: CPT | Mod: PO | Performed by: INTERNAL MEDICINE

## 2020-08-14 PROCEDURE — D9220A PRA ANESTHESIA: ICD-10-PCS | Mod: CRNA,,, | Performed by: NURSE ANESTHETIST, CERTIFIED REGISTERED

## 2020-08-14 PROCEDURE — 63600175 PHARM REV CODE 636 W HCPCS: Mod: PO | Performed by: NURSE ANESTHETIST, CERTIFIED REGISTERED

## 2020-08-14 PROCEDURE — 43248 EGD GUIDE WIRE INSERTION: CPT | Mod: PO | Performed by: INTERNAL MEDICINE

## 2020-08-14 PROCEDURE — 43248 PR EGD, FLEX, W/DILATION OVER GUIDEWIRE: ICD-10-PCS | Mod: ,,, | Performed by: INTERNAL MEDICINE

## 2020-08-14 PROCEDURE — 45385 COLONOSCOPY W/LESION REMOVAL: CPT | Mod: PO | Performed by: INTERNAL MEDICINE

## 2020-08-14 PROCEDURE — 88305 TISSUE EXAM BY PATHOLOGIST: CPT | Mod: 26,,, | Performed by: PATHOLOGY

## 2020-08-14 PROCEDURE — D9220A PRA ANESTHESIA: Mod: ANES,,, | Performed by: ANESTHESIOLOGY

## 2020-08-14 PROCEDURE — D9220A PRA ANESTHESIA: ICD-10-PCS | Mod: ANES,,, | Performed by: ANESTHESIOLOGY

## 2020-08-14 PROCEDURE — 88342 CHG IMMUNOCYTOCHEMISTRY: ICD-10-PCS | Mod: 26,,, | Performed by: PATHOLOGY

## 2020-08-14 PROCEDURE — 43248 EGD GUIDE WIRE INSERTION: CPT | Mod: ,,, | Performed by: INTERNAL MEDICINE

## 2020-08-14 RX ORDER — FENTANYL CITRATE 50 UG/ML
INJECTION, SOLUTION INTRAMUSCULAR; INTRAVENOUS
Status: DISCONTINUED | OUTPATIENT
Start: 2020-08-14 | End: 2020-08-14

## 2020-08-14 RX ORDER — PANTOPRAZOLE SODIUM 40 MG/1
40 TABLET, DELAYED RELEASE ORAL
Qty: 90 TABLET | Refills: 3 | Status: SHIPPED | OUTPATIENT
Start: 2020-08-14 | End: 2021-07-26

## 2020-08-14 RX ORDER — GLYCOPYRROLATE 0.2 MG/ML
INJECTION INTRAMUSCULAR; INTRAVENOUS
Status: DISCONTINUED | OUTPATIENT
Start: 2020-08-14 | End: 2020-08-14

## 2020-08-14 RX ORDER — SODIUM CHLORIDE, SODIUM LACTATE, POTASSIUM CHLORIDE, CALCIUM CHLORIDE 600; 310; 30; 20 MG/100ML; MG/100ML; MG/100ML; MG/100ML
INJECTION, SOLUTION INTRAVENOUS CONTINUOUS
Status: DISCONTINUED | OUTPATIENT
Start: 2020-08-14 | End: 2020-08-14 | Stop reason: HOSPADM

## 2020-08-14 RX ORDER — SUCRALFATE 1 G/1
1 TABLET ORAL
Qty: 120 TABLET | Refills: 11 | Status: SHIPPED | OUTPATIENT
Start: 2020-08-14 | End: 2020-10-06

## 2020-08-14 RX ORDER — LIDOCAINE HYDROCHLORIDE 20 MG/ML
INJECTION INTRAVENOUS
Status: DISCONTINUED | OUTPATIENT
Start: 2020-08-14 | End: 2020-08-14

## 2020-08-14 RX ORDER — SODIUM CHLORIDE 0.9 % (FLUSH) 0.9 %
10 SYRINGE (ML) INJECTION
Status: DISCONTINUED | OUTPATIENT
Start: 2020-08-14 | End: 2020-08-14 | Stop reason: HOSPADM

## 2020-08-14 RX ORDER — PROPOFOL 10 MG/ML
VIAL (ML) INTRAVENOUS
Status: DISCONTINUED | OUTPATIENT
Start: 2020-08-14 | End: 2020-08-14

## 2020-08-14 RX ORDER — PROPOFOL 10 MG/ML
VIAL (ML) INTRAVENOUS CONTINUOUS PRN
Status: DISCONTINUED | OUTPATIENT
Start: 2020-08-14 | End: 2020-08-14

## 2020-08-14 RX ADMIN — SODIUM CHLORIDE, SODIUM LACTATE, POTASSIUM CHLORIDE, AND CALCIUM CHLORIDE: .6; .31; .03; .02 INJECTION, SOLUTION INTRAVENOUS at 11:08

## 2020-08-14 RX ADMIN — PROPOFOL 200 MCG/KG/MIN: 10 INJECTION, EMULSION INTRAVENOUS at 11:08

## 2020-08-14 RX ADMIN — FENTANYL CITRATE 25 MCG: 50 INJECTION, SOLUTION INTRAMUSCULAR; INTRAVENOUS at 11:08

## 2020-08-14 RX ADMIN — GLYCOPYRROLATE 0.2 MG: 0.2 INJECTION, SOLUTION INTRAMUSCULAR; INTRAVENOUS at 11:08

## 2020-08-14 RX ADMIN — FENTANYL CITRATE 25 MCG: 50 INJECTION, SOLUTION INTRAMUSCULAR; INTRAVENOUS at 12:08

## 2020-08-14 RX ADMIN — LIDOCAINE HYDROCHLORIDE 100 MG: 20 INJECTION, SOLUTION INTRAVENOUS at 11:08

## 2020-08-14 RX ADMIN — FENTANYL CITRATE 50 MCG: 50 INJECTION, SOLUTION INTRAMUSCULAR; INTRAVENOUS at 11:08

## 2020-08-14 RX ADMIN — PROPOFOL 50 MG: 10 INJECTION, EMULSION INTRAVENOUS at 11:08

## 2020-08-14 RX ADMIN — PROPOFOL 100 MG: 10 INJECTION, EMULSION INTRAVENOUS at 11:08

## 2020-08-14 NOTE — TRANSFER OF CARE
"Anesthesia Transfer of Care Note    Patient: Seymour Park    Procedure(s) Performed: Procedure(s) (LRB):  EGD (ESOPHAGOGASTRODUODENOSCOPY) (N/A)  COLONOSCOPY (N/A)    Patient location: PACU    Anesthesia Type: general    Transport from OR: Transported from OR on 6-10 L/min O2 by face mask with adequate spontaneous ventilation    Post pain: adequate analgesia    Post assessment: no apparent anesthetic complications and tolerated procedure well    Post vital signs: stable    Level of consciousness: awake, alert and oriented    Nausea/Vomiting: no nausea/vomiting    Complications: none    Transfer of care protocol was followed      Last vitals:   Visit Vitals  BP (!) 108/56 (BP Location: Right arm, Patient Position: Lying)   Pulse 78   Temp 36.7 °C (98.1 °F) (Skin)   Resp 14   Ht 5' 11" (1.803 m)   Wt 117.9 kg (260 lb)   SpO2 99%   BMI 36.26 kg/m²     "

## 2020-08-14 NOTE — ANESTHESIA POSTPROCEDURE EVALUATION
Anesthesia Post Evaluation    Patient: Seymour Park    Procedure(s) Performed: Procedure(s) (LRB):  EGD (ESOPHAGOGASTRODUODENOSCOPY) (N/A)  COLONOSCOPY (N/A)    Final Anesthesia Type: general    Patient location during evaluation: PACU  Patient participation: Yes- Able to Participate  Level of consciousness: sedated and awake  Post-procedure vital signs: reviewed and stable  Pain management: adequate  Airway patency: patent    PONV status at discharge: No PONV  Anesthetic complications: no      Cardiovascular status: blood pressure returned to baseline  Respiratory status: spontaneous ventilation  Hydration status: euvolemic  Follow-up not needed.          Vitals Value Taken Time   /52 08/14/20 1245   Temp 36.7 °C (98.1 °F) 08/14/20 1240   Pulse 77 08/14/20 1245   Resp 15 08/14/20 1245   SpO2 99 % 08/14/20 1245         No case tracking events are documented in the log.      Pain/Denise Score: Denise Score: 5 (8/14/2020 12:40 PM)

## 2020-08-14 NOTE — [employer]ATION PATIENT INSTRUCTIONS
Discharge Summary/Instructions for after Colonoscopy with   Biopsy/Polypectomy  Seymour Park    Friday, August 14, 2020  Dwaine Murphy MD  RESTRICTIONS ON ACTIVITY:  - Do not drive a car or operate machinery until the day after the procedure.      - The following day: return to full activity including work.  - For  3 days: No heavy lifting, straining or running.  - Diet: You can have solid foods, but no gassy foods (i.e. beans, broccoli,   cabbage, etc).  TREATMENT FOR COMMON SIDE EFFECTS:  - Mild abdominal pain and bloating or excessive gas: rest, eat lightly and   use a heating pad.  SYMPTOMS TO WATCH FOR AND REPORT TO YOUR PHYSICIAN:  1. Severe abdominal pain.  2. Fever within 24 hours after a procedure.  3. A large amount of rectal bleeding. (A small amount of blood from the   rectum is not serious, especially if hemorrhoids are present.  3.  Because air was put into your colon during the procedure, expelling   large amounts of air from your rectum is normal.  4.  You may not have a bowel movement for 1-3 days because of the   colonoscopy prep.  This is normal.  5.  Call immediately if you notice any of the following:   Chills and/or fever over 101   Persistent vomiting   Severe abdominal pain, other than gas cramps   Severe chest pain   Black, tarry stools   Any bleeding - exceeding one tablespoon  Your doctor recommends these additional instructions:  We are waiting for your pathology results.   Eat a high fiber diet.   Take a fiber supplement, for example Citrucel, Fibercon, Konsyl or   Metamucil.   Use Preparation H suppository: Insert rectally as necessary.   Your physician has recommended a repeat colonoscopy in 5 years for   surveillance.  None  If you have any questions or problems, please call your physician.  EMERGENCY PHONE NUMBER: [phone]  LAB RESULTS: Call in two (2) weeks for lab results, [phone]  ___________________________________________  Nurse  Signature  ___________________________________________  Patient/Designated Responsible Party Signature  Dwaine Murphy MD  8/14/2020 1:24:28 PM  This report has been verified and signed electronically.  [employer]

## 2020-08-14 NOTE — DISCHARGE INSTRUCTIONS
Recovery After Procedural Sedation (Adult)   You have been given medicine by vein to make you sleep during your surgery. This may have included both a pain medicine and sleeping medicine. Most of the effects have worn off. But you may still have some drowsiness for the next 6 to 8 hours.  Home care  Follow these guidelines when you get home:  · For the next 8 hours, you should be watched by a responsible adult. This person should make sure your condition is not getting worse.  · Don't drink any alcohol for the next 24 hours.  · Don't drive, operate dangerous machinery, or make important business or personal decisions during the next 24 hours.  · To prevent injury or falls, use caution when standing and walking for at least 24 hours after your procedure.  Note: Your healthcare provider may tell you not to take any medicine by mouth for pain or sleep in the next 4 hours. These medicines may react with the medicines you were given in the hospital. This could cause a much stronger response than usual.  Follow-up care  Follow up with your healthcare provider if you are not alert and back to your usual level of activity within 12 hours.  When to seek medical advice  Call your healthcare provider right away if any of these occur:  · Drowsiness gets worse  · Weakness or dizziness gets worse  · Repeated vomiting  · You can't be awakened  · Fever  · New rash  [employer] last reviewed this educational content on 9/1/2019  © 3567-5041 [employer]. [address] [place] [address]. All rights reserved. This information is not intended as a substitute for professional medical care. Always follow your healthcare professional's instructions.        Tips to Control Acid Reflux    To control acid reflux, youll need to make some basic diet and lifestyle changes. The simple steps outlined below may be all youll need to ease discomfort.  Watch what you eat  · Avoid fatty foods and spicy foods.  · Eat fewer  acidic foods, such as citrus and tomato-based foods. These can increase symptoms.  · Limit drinking alcohol, caffeine, and fizzy beverages. All increase acid reflux.  · Try limiting chocolate, peppermint, and spearmint. These can worsen acid reflux in some people.  Watch when you eat  · Avoid lying down for 3 hours after eating.  · Do not snack before going to bed.  Raise your head  Raising your head and upper body by 4 to 6 inches helps limit reflux when youre lying down. Put blocks under the head of your bed frame to raise it.  Other changes  · Lose weight, if you need to  · Dont exercise near bedtime  · Avoid tight-fitting clothes  · Limit aspirin and ibuprofen  · Stop smoking   Date Last Reviewed: 7/1/2016 © 2000-2017 [employer]. [address] [place] [address], [place] [address]. All rights reserved. This information is not intended as a substitute for professional medical care. Always follow your healthcare professional's instructions.        High-Fiber Diet  Fiber is in fruits, vegetables, cereals, and grains. Fiber passes through your body undigested. A high-fiber diet helps food move through your intestinal tract. The added bulk is helpful in preventing constipation. In people with diverticulosis, fiber helps clean out the pouches along the colon wall. It also prevents new pouches from forming. A high-fiber diet reduces the risk of colon cancer. It also lowers blood cholesterol and prevents high blood sugar in people with diabetes.    The fiber-rich foods listed below should be part of your diet. If you are not used to high-fiber foods, start with 1 or 2 foods from this list. Every 3 to 4 days add a new one to your diet. Do this until you are eating 4 high-fiber foods per day. This should give you 20 to 35 grams of fiber a day. It is also important to drink a lot of water when you are on this diet. You should have 6 to 8 glasses of water a day. Water makes the fiber swell and increases the  benefit.  Foods high in dietary fiber  The following foods are high in dietary fiber:  · Breads. Breads made with 100% whole-wheat flour; arun, wheat, or rye crackers; whole-grain tortillas, bran muffins.  · Cereals. Whole-grain and bran cereals with bran (shredded wheat, wheat flakes, raisin bran, corn bran); oatmeal, rolled oats, granola, and brown rice.  · Fruits. Fresh fruits and their edible skins (pears, prunes, raisins, berries, apples, and apricots); bananas, citrus fruit, mangoes, pineapple; and prune juice.  · Nuts. Any nuts and seeds.  · Vegetables. Best served raw or lightly cooked. All types, especially: green peas, celery, eggplant, potatoes, spinach, broccoli, [place] sprouts, winter squash, carrots, cauliflower, soybeans, lentils, and fresh and dried beans of all kinds.  · Other. Popcorn, any spices.  Date Last Reviewed: 8/1/2016  © 3373-7122 [employer]. [address] [place] [address], [place] [address]. All rights reserved. This information is not intended as a substitute for professional medical care. Always follow your healthcare professional's instructions.

## 2020-08-14 NOTE — [employer] PATIENT INSTRUCTIONS
Discharge Summary/Instructions after an Endoscopic Procedure  Patient Name: Seymour Park  Patient MRN: [id number]  Patient YOB: 1980 Friday, August 14, 2020  Dwaine Murphy MD  RESTRICTIONS:  During your procedure today, you received medications for sedation.  These   medications may affect your judgment, balance and coordination.  Therefore,   for 24 hours, you have the following restrictions:   - DO NOT drive a car, operate machinery, make legal/financial decisions,   sign important papers or drink alcohol.    ACTIVITY:  Today: no heavy lifting, straining or running due to procedural   sedation/anesthesia.  The following day: return to full activity including work.  DIET:  Eat and drink normally unless instructed otherwise.     TREATMENT FOR COMMON SIDE EFFECTS:  - Mild abdominal pain, nausea, belching, bloating or excessive gas:  rest,   eat lightly and use a heating pad.  - Sore Throat: treat with throat lozenges and/or gargle with warm salt   water.  - Because air was used during the procedure, expelling large amounts of air   from your rectum or belching is normal.  - If a bowel prep was taken, you may not have a bowel movement for 1-3 days.    This is normal.  SYMPTOMS TO WATCH FOR AND REPORT TO YOUR PHYSICIAN:  1. Abdominal pain or bloating, other than gas cramps.  2. Chest pain.  3. Back pain.  4. Signs of infection such as: chills or fever occurring within 24 hours   after the procedure.  5. Rectal bleeding, which would show as bright red, maroon, or black stools.   (A tablespoon of blood from the rectum is not serious, especially if   hemorrhoids are present.)  6. Vomiting.  7. Weakness or dizziness.  GO DIRECTLY TO THE NEAREST EMERGENCY ROOM IF YOU HAVE ANY OF THE FOLLOWING:      Difficulty breathing              Chills and/or fever over 101 F   Persistent vomiting and/or vomiting blood   Severe abdominal pain   Severe chest pain   Black, tarry stools   Bleeding- more than one  tablespoon   Any other symptom or condition that you feel may need urgent attention  Your doctor recommends these additional instructions:  If any biopsies were taken, your doctors clinic will contact you in 1 to 2   weeks with any results.  Continue your present medications.   Take Protonix (pantoprazole) 40 mg by mouth once a day.   Take Carafate (sucralfate) tablets 1 gram by mouth four times a day, before   meals and at bedtime.   Return to  GI clinic in 4-6 weeks.   For questions, problems or results please call your physician - Dwaine Murphy MD at Work:  [phone].  EMERGENCY PHONE NUMBER: [phone], LAB RESULTS: [phone]  IF A COMPLICATION OR EMERGENCY SITUATION ARISES AND YOU ARE UNABLE TO REACH   YOUR PHYSICIAN - GO DIRECTLY TO THE EMERGENCY ROOM.  ___________________________________________  Nurse Signature  ___________________________________________  Patient/Designated Responsible Party Signature  Dwaine Murphy MD  8/14/2020 1:04:37 PM  This report has been verified and signed electronically.  [employer]

## 2020-08-14 NOTE — BRIEF OP NOTE
Discharge Note  Short Stay      SUMMARY     Admit Date: 8/14/2020    Attending Physician: Dwaine Murphy Jr., MD     Discharge Physician: Dwaine Murphy Jr., MD    Discharge Date: 8/14/2020 1:26 PM    Final Diagnosis: Hematochezia [K92.1]  Pharyngoesophageal dysphagia [R13.14]  History of gastroesophageal reflux (GERD) [Z87.19]  History of sleeve gastrectomy [Z90.3]      Impression:          - The tonsils are large.                        - Normal larynx.                        - Normal upper third of esophagus and middle third                        of esophagus.                        - Reflux vs stasis chronic esophagitis. Biopsied.                        - Mild Schatzki ring (vs hypertonic LES) . Dilated,                        51 Fr.                        - Z-line regular, 37 cm from the incisors.                        - Patulous lower esophageal sphincter(?).                        - A sleeve gastrectomy was found, characterized by                        healthy appearing mucosa.                        - A single antrum polyp. Biopsied.                        - Minimal antritis. Biopsied.                        - Normal stomach otherwise.                        - Normal pylorus.                        - Normal examined duodenum.   Recommendation:      - Perform a colonoscopy as previously scheduled.                        - Discharge patient to home after that.                        - Await pathology and CLOtest results.                        - Observe patient's clinical course following                        today's procedure with therapeutic intervention.                        - Continue present medications.                        - Use Protonix (pantoprazole) 40 mg PO daily.                        - Use sucralfate tablets 1 gram PO QID.                        - Return to GI clinic in 4-6 weeks.                        - If no improvement, next perform a modified barium                         swallow.     Impression:          - Two 1 mm and 2 mm polyps in the rectum, removed                        with a cold snare. Resected and retrieved.                        - Four 2 to 3 mm polyps in the sigmoid colon,                        removed with a cold snare. Resected and retrieved.                        - One 2 mm polyp at the splenic flexure, removed                        with a cold snare. Resected and retrieved.                        - Two 2 to 3 mm polyps in the ascending colon,                        removed with a cold snare. Resected and retrieved.                        - Non-bleeding internal hemorrhoids.                        - Redundant colon.                        - The examination was otherwise normal.                        - The examined portion of the ileum was normal.   Recommendation:      - Discharge patient to home.                        - Await pathology results.                        - High fiber diet.                        - Use fiber, for example Citrucel, Fibercon, Konsyl                        or Metamucil.                        - Preparation H suppository: Insert rectally as                        necessary.                        - Call the G.I. clinic in 2 weeks for reports (if                        you haven't heard from us sooner) [phone].                        - Repeat colonoscopy in 5 years for surveillance.                        - Continue present medications.     Dwaine Murphy MD   8/14/2020   Disposition: HOME OR SELF CARE    Patient Instructions:   Current Discharge Medication List      CONTINUE these medications which have NOT CHANGED    Details   omeprazole (PRILOSEC) 20 MG capsule Take 20 mg by mouth once daily.      atorvastatin (LIPITOR) 40 MG tablet Take 1 tablet (40 mg total) by mouth once daily.  Qty: 90 tablet, Refills: 3    Associated Diagnoses: Fatty liver      pantoprazole (PROTONIX) 40 MG tablet Take 1 tablet (40 mg total) by mouth once  daily.  Qty: 30 tablet, Refills: 11    Associated Diagnoses: Heartburn      predniSONE (DELTASONE) 20 MG tablet TK 2 TS PO ONCE D FOR 3 DAYS  Refills: 0      varenicline (CHANTIX STARTING MONTH BOX) 0.5 mg (11)- 1 mg (42) tablet Take one 0.5mg tab by mouth once daily X3 days,then increase to one 0.5mg tab twice daily X4 days,then increase to one 1mg tab twice daily  Qty: 1 Package, Refills: 0    Associated Diagnoses: Tobacco use             Discharge Procedure Orders (must include Diet, Follow-up, Activity)    Follow Up:  Follow up with PCP as per your routine.  Please follow an anti reflux diet and a high fiber diet.  Activity as tolerated.    No driving day of procedure.

## 2020-08-14 NOTE — ANESTHESIA PREPROCEDURE EVALUATION
08/14/2020  Seymour Pakr is a 40 y.o., male.    Anesthesia Evaluation    I have reviewed the Patient Summary Reports.    I have reviewed the Nursing Notes. I have reviewed the NPO Status.   I have reviewed the Medications.     Review of Systems  Social:  Smoker    Cardiovascular:   hyperlipidemia ECG has been reviewed.    Pulmonary:  Pulmonary Normal    Hepatic/GI:   GERD Liver Disease, Gastric procedure in past   Neurological:  Neurology Normal    Endocrine:  Endocrine Normal    Psych:  Psychiatric Normal           Physical Exam  General:  Obesity    Airway/Jaw/Neck:  Airway Findings: Mouth Opening: Normal Tongue: Normal  General Airway Assessment: Adult  Mallampati: IV  Improves to III with phonation.      Dental:  Dental Findings: In tact   Chest/Lungs:  Chest/Lungs Findings: Clear to auscultation, Normal Respiratory Rate         Mental Status:  Mental Status Findings:  Alert and Oriented, Cooperative         Anesthesia Plan  Type of Anesthesia, risks & benefits discussed:  Anesthesia Type:  general  Patient's Preference:   Intra-op Monitoring Plan: standard ASA monitors  Intra-op Monitoring Plan Comments:   Post Op Pain Control Plan:   Post Op Pain Control Plan Comments:   Induction:   IV  Beta Blocker:  Patient is not currently on a Beta-Blocker (No further documentation required).       Informed Consent: Patient understands risks and agrees with Anesthesia plan.  Questions answered. Anesthesia consent signed with patient.  ASA Score: 2     Day of Surgery Review of History & Physical:            Ready For Surgery From Anesthesia Perspective.

## 2020-08-17 VITALS
HEIGHT: 71 IN | RESPIRATION RATE: 18 BRPM | OXYGEN SATURATION: 98 % | BODY MASS INDEX: 36.4 KG/M2 | HEART RATE: 80 BPM | DIASTOLIC BLOOD PRESSURE: 81 MMHG | TEMPERATURE: 98 F | SYSTOLIC BLOOD PRESSURE: 142 MMHG | WEIGHT: 260 LBS

## 2020-08-19 LAB
FINAL PATHOLOGIC DIAGNOSIS: NORMAL
GROSS: NORMAL

## 2020-10-05 ENCOUNTER — PATIENT MESSAGE (OUTPATIENT)
Dept: ADMINISTRATIVE | Facility: HOSPITAL | Age: 40
End: 2020-10-05

## 2020-10-06 ENCOUNTER — OFFICE VISIT (OUTPATIENT)
Dept: GASTROENTEROLOGY | Facility: CLINIC | Age: 40
End: 2020-10-06
Payer: COMMERCIAL

## 2020-10-06 VITALS — HEIGHT: 71 IN | WEIGHT: 272.25 LBS | BODY MASS INDEX: 38.11 KG/M2

## 2020-10-06 DIAGNOSIS — Z98.890 HISTORY OF ESOPHAGOGASTRODUODENOSCOPY (EGD): Primary | ICD-10-CM

## 2020-10-06 DIAGNOSIS — R93.2 ABNORMAL GALLBLADDER ULTRASOUND: ICD-10-CM

## 2020-10-06 DIAGNOSIS — Z90.3 HISTORY OF SLEEVE GASTRECTOMY: ICD-10-CM

## 2020-10-06 DIAGNOSIS — R16.0 HEPATOMEGALY: ICD-10-CM

## 2020-10-06 DIAGNOSIS — K21.00 GASTROESOPHAGEAL REFLUX DISEASE WITH ESOPHAGITIS WITHOUT HEMORRHAGE: ICD-10-CM

## 2020-10-06 DIAGNOSIS — K76.0 FATTY LIVER: ICD-10-CM

## 2020-10-06 DIAGNOSIS — Z98.890 HISTORY OF COLONOSCOPY: ICD-10-CM

## 2020-10-06 DIAGNOSIS — Z51.81 ENCOUNTER FOR MONITORING LONG-TERM PROTON PUMP INHIBITOR THERAPY: ICD-10-CM

## 2020-10-06 DIAGNOSIS — R13.14 PHARYNGOESOPHAGEAL DYSPHAGIA: ICD-10-CM

## 2020-10-06 DIAGNOSIS — K29.50 OTHER CHRONIC GASTRITIS WITHOUT HEMORRHAGE: ICD-10-CM

## 2020-10-06 DIAGNOSIS — K22.2 SCHATZKI'S RING: ICD-10-CM

## 2020-10-06 DIAGNOSIS — R79.89 ELEVATED LFTS: ICD-10-CM

## 2020-10-06 DIAGNOSIS — Z79.899 ENCOUNTER FOR MONITORING LONG-TERM PROTON PUMP INHIBITOR THERAPY: ICD-10-CM

## 2020-10-06 PROCEDURE — 99999 PR PBB SHADOW E&M-EST. PATIENT-LVL III: CPT | Mod: PBBFAC,,, | Performed by: NURSE PRACTITIONER

## 2020-10-06 PROCEDURE — 99214 PR OFFICE/OUTPT VISIT, EST, LEVL IV, 30-39 MIN: ICD-10-PCS | Mod: S$GLB,,, | Performed by: NURSE PRACTITIONER

## 2020-10-06 PROCEDURE — 99214 OFFICE O/P EST MOD 30 MIN: CPT | Mod: S$GLB,,, | Performed by: NURSE PRACTITIONER

## 2020-10-06 PROCEDURE — 99999 PR PBB SHADOW E&M-EST. PATIENT-LVL III: ICD-10-PCS | Mod: PBBFAC,,, | Performed by: NURSE PRACTITIONER

## 2020-10-06 RX ORDER — SUCRALFATE 1 G/1
1 TABLET ORAL
Qty: 120 TABLET | Refills: 0 | Status: SHIPPED | OUTPATIENT
Start: 2020-10-06 | End: 2020-10-13

## 2020-10-06 NOTE — PROGRESS NOTES
Subjective:       Patient ID: Seymour Park is a 40 y.o. male Body mass index is 37.97 kg/m².    Chief Complaint: Other (Follow-up EGD & colonoscopy)    This patient is established with Dr. Murphy & myself.    GI Problem  Primary symptoms do not include fever, weight loss, fatigue, abdominal pain, nausea, vomiting, diarrhea, melena, hematemesis, hematochezia or dysuria.   The illness is also significant for dysphagia (since sleeve can't eat and drink at the same time, feels like food stays in esophagus (can't have soup since it is solid and liquid together); no change since EGD with dilation). The illness does not include chills, odynophagia, bloating or constipation. Associated symptoms comments: Bowel movements once daily of formed stool. Significant associated medical issues include GERD (controlled on protonix 40 mg daily restarted ~8/14/2020, reports he ran out of it a few months ago and was taking OTC prilsoec with minimal relief; if he misses a dose, he has GERD; PAST: prilosec;  carafate- never took), gallstones, liver disease (fatty enlarged liver) and alcohol abuse (pt reports he stopped drinking heavily since 7/2019, now drinks 4-5 glasses of wine a week). Associated medical issues do not include inflammatory bowel disease.     Review of Systems   Constitutional: Negative for appetite change, chills, fatigue, fever and weight loss.   HENT: Positive for trouble swallowing. Negative for sore throat.    Respiratory: Negative for cough, choking and shortness of breath.    Cardiovascular: Negative for chest pain.   Gastrointestinal: Positive for dysphagia (since sleeve can't eat and drink at the same time, feels like food stays in esophagus (can't have soup since it is solid and liquid together); no change since EGD with dilation). Negative for abdominal pain, anal bleeding, bloating, blood in stool, constipation, diarrhea, hematemesis, hematochezia, melena, nausea, rectal pain and vomiting.   Genitourinary:  Negative for difficulty urinating, dysuria and flank pain.   Neurological: Negative for weakness.       Past Medical History:   Diagnosis Date    Cholelithiasis     Colon polyp     Fatty liver     Gout     Hepatomegaly     History of bariatric surgery     Obesity     Tobacco use      Past Surgical History:   Procedure Laterality Date    APPENDECTOMY  ~    COLONOSCOPY N/A 2020    Procedure: COLONOSCOPY;  Surgeon: Dwaine Murphy Jr., MD;  Location: [facility];  Service: Endoscopy;  Laterality: N/A; 9 colon polyps removed, Redundant colon. hemorrhoids; repeat in 5 years for surveillance; biopsy: polyps- tubular adenoma x1, others were hyperplastic polyps    ESOPHAGOGASTRODUODENOSCOPY N/A 2020    Dr. Murphy: Reflux vs stasis chronic esophagitis, Mild Schatzki ring (vs hypertonic LES) . Dilated, 51 Fr. sleeve gastrectomy was found, characterized by healthy appearing mucosa, single antrum polyp biopsied. antritis; biopsy: esophagus WNL, polyp-hyperplastic polyp(s), stomach- mild chronic gastritis, negative for h pylori    Gastric sleeve  2013    VASECTOMY       Family History   Problem Relation Age of Onset    Colon cancer Neg Hx     Crohn's disease Neg Hx     Esophageal cancer Neg Hx     Stomach cancer Neg Hx     Liver cancer Neg Hx     Liver disease Neg Hx       Social History     Tobacco Use    Smoking status: Former Smoker     Types: Cigarettes     Quit date: 2019     Years since quittin.1    Smokeless tobacco: Never Used   Substance Use Topics    Alcohol use: Yes     Alcohol/week: 4.0 - 5.0 standard drinks     Types: 4 - 5 Glasses of wine per week    Drug use: Not Currently     Comment: marijuana in college      Wt Readings from Last 10 Encounters:   10/06/20 123.5 kg (272 lb 4.3 oz)   20 117.9 kg (260 lb)   19 117.2 kg (258 lb 6.1 oz)   19 117.1 kg (258 lb 2.5 oz)     Lab Results   Component Value Date    WBC 6.05 2019    HGB 15.3 2019     HCT 46.3 07/30/2019    MCV 83 07/30/2019     07/30/2019     CMP  Sodium   Date Value Ref Range Status   07/30/2019 140 136 - 145 mmol/L Final     Potassium   Date Value Ref Range Status   07/30/2019 4.7 3.5 - 5.1 mmol/L Final     Chloride   Date Value Ref Range Status   07/30/2019 105 95 - 110 mmol/L Final     CO2   Date Value Ref Range Status   07/30/2019 29 23 - 29 mmol/L Final     Glucose   Date Value Ref Range Status   07/30/2019 100 70 - 110 mg/dL Final     BUN, Bld   Date Value Ref Range Status   07/30/2019 15 6 - 20 mg/dL Final     Creatinine   Date Value Ref Range Status   07/30/2019 1.0 0.5 - 1.4 mg/dL Final     Calcium   Date Value Ref Range Status   07/30/2019 9.8 8.7 - 10.5 mg/dL Final     Total Protein   Date Value Ref Range Status   07/30/2019 7.5 6.0 - 8.4 g/dL Final     Albumin   Date Value Ref Range Status   07/30/2019 3.9 3.5 - 5.2 g/dL Final     Total Bilirubin   Date Value Ref Range Status   07/30/2019 0.8 0.1 - 1.0 mg/dL Final     Comment:     For infants and newborns, interpretation of results should be based  on gestational age, weight and in agreement with clinical  observations.  Premature Infant recommended reference ranges:  Up to 24 hours.............<8.0 mg/dL  Up to 48 hours............<12.0 mg/dL  3-5 days..................<15.0 mg/dL  6-29 days.................<15.0 mg/dL       Alkaline Phosphatase   Date Value Ref Range Status   07/30/2019 90 55 - 135 U/L Final     AST   Date Value Ref Range Status   07/30/2019 84 (H) 10 - 40 U/L Final     ALT   Date Value Ref Range Status   07/30/2019 160 (H) 10 - 44 U/L Final     Anion Gap   Date Value Ref Range Status   07/30/2019 6 (L) 8 - 16 mmol/L Final     eGFR if    Date Value Ref Range Status   07/30/2019 >60.0 >60 mL/min/1.73 m^2 Final     eGFR if non    Date Value Ref Range Status   07/30/2019 >60.0 >60 mL/min/1.73 m^2 Final     Comment:     Calculation used to obtain the estimated glomerular  filtration  rate (eGFR) is the CKD-EPI equation.        Reviewed prior medical records including radiology report of 7/30/19 abdominal ultrasound & endoscopy history (see surgical history).    Objective:      Physical Exam  Vitals signs and nursing note reviewed.   Constitutional:       General: He is not in acute distress.     Appearance: Normal appearance. He is well-developed. He is not diaphoretic.   HENT:      Mouth/Throat:      Comments: Patient is wearing a face mask, which covers patient's mouth and nose, due to COVID 19 concerns.  Eyes:      General: No scleral icterus.     Conjunctiva/sclera: Conjunctivae normal.      Pupils: Pupils are equal, round, and reactive to light.   Pulmonary:      Effort: Pulmonary effort is normal. No respiratory distress.      Breath sounds: Normal breath sounds. No wheezing.   Abdominal:      General: Bowel sounds are normal. There is no distension or abdominal bruit.      Palpations: Abdomen is soft. Abdomen is not rigid. There is no mass.      Tenderness: There is no abdominal tenderness. There is no guarding or rebound. Negative signs include Dejesus's sign and McBurney's sign.      Comments: Well-healed surgical scars noted. Patient declined rectal exam.   Skin:     General: Skin is warm and dry.      Coloration: Skin is not pale.      Findings: No erythema or rash.      Comments: Non-jaundiced   Neurological:      Mental Status: He is alert and oriented to person, place, and time.   Psychiatric:         Behavior: Behavior normal.         Thought Content: Thought content normal.         Judgment: Judgment normal.         Assessment:       1. History of esophagogastroduodenoscopy (EGD)    2. Gastroesophageal reflux disease with esophagitis without hemorrhage    3. Other chronic gastritis without hemorrhage    4. History of sleeve gastrectomy    5. History of colonoscopy    6. Elevated LFTs    7. Hepatomegaly    8. Fatty liver    9. Abnormal gallbladder ultrasound    10.  "Encounter for monitoring long-term proton pump inhibitor therapy    11. Pharyngoesophageal dysphagia    12. Schatzki's ring        Plan:       History of esophagogastroduodenoscopy (EGD), Gastroesophageal reflux disease with esophagitis without hemorrhage, & Other chronic gastritis without hemorrhage  -reviewed results of EGD with patient; patient verbalized understanding  -  START   sucralfate (CARAFATE) 1 gram tablet; Take 1 tablet (1 g total) by mouth 4 (four) times daily before meals and nightly.  Dispense: 120 tablet; Refill: 0; RECOMMEND TAKING AN ONE MONTH COURSE OF CARAFATE  - CONTINUE PROTONIX 40 MG ONCE DAILY AS DIRECTED    History of sleeve gastrectomy  Recommend follow-up with bariatric surgery for continued evaluation and management.    History of colonoscopy  - next surveillance colonoscopy due 8/2025    Elevated LFTs, Hepatomegaly, & Fatty liver  -     Hepatic Function Panel; Future; Expected date: 10/06/2020  -     Hepatitis Panel, Acute; Future; Expected date: 10/06/2020  For fatty liver recommend: low fat, low cholesterol diet, maintain good control of blood sugars and cholesterol levels, exercise, weight loss (if overweight), minimize/avoid alcohol and tylenol products, & follow-up with PCP for continued evaluation and management; if specialist is needed, recommend seeing hepatology.  - discussed about avoiding alcohol use and if not able to avoid completely, recommend decreasing alcohol use. - Discussed with patient that the CDC recommends the following: "The Dietary Guidelines for Americans defines moderate drinking as up to 1 drink per day for women and up to 2 drinks per day for men." (obtained from the CDC web site); due to elevated liver function levels and history of enlarged fatty liver, I strongly recommend avoiding alcohol use patient verbalized understanding.    Abnormal gallbladder ultrasound  - recommend low fat diet  - discussed diagnosis & that it can cause symptoms in some " patients, while other patients with it can be asymptomatic; recommend seeing general surgery for further evaluation and management, patient verbalized understanding    Encounter for monitoring long-term proton pump inhibitor therapy  -     CBC Without Differential; Future; Expected date: 10/06/2020  -     Basic Metabolic Panel; Future; Expected date: 10/06/2020  -     Vitamin B12; Future; Expected date: 10/06/2020  -     Magnesium; Future; Expected date: 10/06/2020  - discussed with patient about long term use of reflux medications (preference to use lowest effective dose or discontinuing if possible), the risk and benefits of using these medications long term, the risk of untreated GERD such as molina's esophagus, and recommend a diet high in calcium and/or taking OTC calcium and vitamin d supplements as directed (such as Citracal +D), patient verbalized understanding & patient wants to continue protonix 40 mg once daily.  - recommend annual monitoring with blood work to include CMP, CBC, vitamin B12, and magnesium.    Pharyngoesophageal dysphagia & Schatzki's ring  -     Fl Modified Barium Swallow Speech; Future; Expected date: 10/06/2020  -     FL Esophagram With Barium Tablet; Future; Expected date: 10/06/2020  Recommend follow-up with bariatric surgery for continued evaluation and management.  - educated patient to eat smaller more frequent meals and to eat slowly and advised to eat a soft diet.  - possible esophageal manometry if symptoms persist    Follow up in about 1 month (around 11/6/2020), or if symptoms worsen or fail to improve.      If no improvement in symptoms or symptoms worsen, call/follow-up at clinic or go to ER.

## 2020-10-06 NOTE — PATIENT INSTRUCTIONS
Gastritis (Adult)    Gastritis is inflammation and irritation of the stomach lining. It can be present for a short time (acute) or be long lasting (chronic). Gastritis is often caused by infection with bacteria called H pylori. More than a third of people in the [place] have this bacteria in their bodies. In many cases, H pylori causes no problems or symptoms. In some people, though, the infection irritates the stomach lining and causes gastritis. Other causes of stomach irritation include drinking alcohol or taking pain-relieving medicines called NSAIDs (such as aspirin or ibuprofen).   Symptoms of gastritis can include:  · Abdominal pain or bloating  · Loss of appetite  · Nausea or vomiting  · Vomiting blood or having black stools  · Feeling more tired than usual  An inflamed and irritated stomach lining is more likely to develop a sore called an ulcer. To help prevent this, gastritis should be treated.  Home care  If needed, medicines may be prescribed. If you have H pylori infection, treating it will likely relieve your symptoms. Other changes can help reduce stomach irritation and help it heal.  · If you have been prescribed medicines for H pylori infection, take them as directed. Take all of the medicine until it is finished or your healthcare provider tells you to stop, even if you feel better.  · Your healthcare provider may recommend avoiding NSAIDs. If you take daily aspirin for your heart or other medical reasons, do not stop without talking to your healthcare provider first.  · Avoid drinking alcohol.  · Stop smoking. Smoking can irritate the stomach and delay healing. As much as possible, stay away from second hand smoke.  Follow-up care  Follow up with your healthcare provider, or as advised by our staff. Testing may be needed to check for inflammation or an ulcer.  When to seek medical advice  Call your healthcare provider for any of the following:  · Stomach pain that gets worse or moves to the lower  right abdomen (appendix area)  · Chest pain that appears or gets worse, or spreads to the back, neck, shoulder, or arm  · Frequent vomiting (cant keep down liquids)  · Blood in the stool or vomit (red or black in color)  · Feeling weak or dizzy  · Fever of 100.4ºF (38ºC) or higher, or as directed by your healthcare provider  Date Last Reviewed: 6/22/2015 © 2000-2017 [employer]. [address] [place] [address]. All rights reserved. This information is not intended as a substitute for professional medical care. Always follow your healthcare professional's instructions.       GERD (Adult)    The esophagus is a tube that carries food from the mouth to the stomach. A valve at the lower end of the esophagus prevents stomach acid from flowing upward. When this valve doesn't work properly, stomach contents may repeatedly flow back up (reflux) into the esophagus. This is called gastroesophageal reflux disease (GERD). GERD can irritate the esophagus. It can cause problems with swallowing or breathing. In severe cases, GERD can cause recurrent pneumonia or other serious problems.  Symptoms of reflux include burning, pressure or sharp pain in the upper abdomen or mid to lower chest. The pain can spread to the neck, back, or shoulder. There may be belching, an acid taste in the back of the throat, chronic cough, or sore throat or hoarseness. GERD symptoms often occur during the day after a big meal. They can also occur at night when lying down.   Home care  Lifestyle changes can help reduce symptoms. If needed, medicines may be prescribed. Symptoms often improve with treatment, but if treatment is stopped, the symptoms often return after a few months. So most persons with GERD will need to continue treatment.  Lifestyle changes  · Limit or avoid fatty, fried, and spicy foods, as well as coffee, chocolate, mint, and foods with high acid content such as tomatoes and citrus fruit and juices (orange,  "grapefruit, lemon).  · Dont eat large meals, especially at night. Frequent, smaller meals are best. Do not lie down right after eating. And dont eat anything 3 hours before going to bed.  · Avoid drinking alcohol and smoking. As much as possible, stay away from second hand smoke.  · If you are overweight, losing weight will reduce symptoms.   · Avoid wearing tight clothing around your stomach area.  · If your symptoms occur during sleep, use a foam wedge to elevate your upper body (not just your head.) Or, place 4" blocks under the head of your bed.  Medicines  If needed, medicines can help relieve the symptoms of GERD and prevent damage to the esophagus. Discuss a medicine plan with your healthcare provider. This may include one or more of the following medicines:  · Antacids to help neutralize the normal acids in your stomach.  · Acid blockers (H2 blockers) to decrease acid production.  · Acid inhibitors (PPIs) to decrease acid production in a different way than the blockers. They may work better, but can take a little longer to take effect.  Take an antacid 30-60 minutes after eating and at bedtime, but not at the same time as an acid blocker.  Try not to take medicines such as ibuprofen and aspirin. If you are taking aspirin for your heart or other medical reasons, talk to your healthcare provider about stopping it.  Follow-up care  Follow up with your healthcare provider or as advised by our staff.  When to seek medical advice  Call your healthcare provider if any of the following occur:  · Stomach pain gets worse or moves to the lower right abdomen (appendix area)  · Chest pain appears or gets worse, or spreads to the back, neck, shoulder, or arm  · Frequent vomiting (cant keep down liquids)  · Blood in the stool or vomit (red or black in color)  · Feeling weak or dizzy  · Fever of 100.4ºF (38ºC) or higher, or as directed by your healthcare provider  Date Last Reviewed: 6/23/2015  © 7357-0705 [employer]. [address] [place] [address], [place] [address]. All rights reserved. This information is not intended as a substitute for professional medical care. Always follow your healthcare professional's instructions.

## 2020-10-13 ENCOUNTER — OFFICE VISIT (OUTPATIENT)
Dept: CARDIOLOGY | Facility: CLINIC | Age: 40
End: 2020-10-13
Payer: COMMERCIAL

## 2020-10-13 VITALS
DIASTOLIC BLOOD PRESSURE: 92 MMHG | WEIGHT: 272.69 LBS | BODY MASS INDEX: 38.18 KG/M2 | HEART RATE: 68 BPM | HEIGHT: 71 IN | SYSTOLIC BLOOD PRESSURE: 138 MMHG

## 2020-10-13 DIAGNOSIS — I10 ESSENTIAL HYPERTENSION: ICD-10-CM

## 2020-10-13 PROCEDURE — 99999 PR PBB SHADOW E&M-EST. PATIENT-LVL III: CPT | Mod: PBBFAC,,, | Performed by: INTERNAL MEDICINE

## 2020-10-13 PROCEDURE — 99204 OFFICE O/P NEW MOD 45 MIN: CPT | Mod: S$GLB,,, | Performed by: INTERNAL MEDICINE

## 2020-10-13 PROCEDURE — 93000 ELECTROCARDIOGRAM COMPLETE: CPT | Mod: S$GLB,,, | Performed by: INTERNAL MEDICINE

## 2020-10-13 PROCEDURE — 99999 PR PBB SHADOW E&M-EST. PATIENT-LVL III: ICD-10-PCS | Mod: PBBFAC,,, | Performed by: INTERNAL MEDICINE

## 2020-10-13 PROCEDURE — 99204 PR OFFICE/OUTPT VISIT, NEW, LEVL IV, 45-59 MIN: ICD-10-PCS | Mod: S$GLB,,, | Performed by: INTERNAL MEDICINE

## 2020-10-13 PROCEDURE — 93000 EKG 12-LEAD: ICD-10-PCS | Mod: S$GLB,,, | Performed by: INTERNAL MEDICINE

## 2020-10-13 RX ORDER — LISINOPRIL 5 MG/1
5 TABLET ORAL DAILY
Qty: 90 TABLET | Refills: 3 | Status: SHIPPED | OUTPATIENT
Start: 2020-10-13 | End: 2020-11-13 | Stop reason: SINTOL

## 2020-10-13 NOTE — PROGRESS NOTES
Subjective:    Patient ID:  Seymour Park is a 40 y.o. male who presents for evaluation of Hypertension      Pt with no chronic medical problems had been feeling poorly the past several days and had headache. His wife is a nurse and checked his BP and it was 160's-170/100. He otherwise feels fine and has no other complaints. He denies any chest pain, SOB, PND, orthopnea. He denies any palpitations, dizziness, syncope or pre-syncope. He denies claudication, lower extremity edema. He denies exertional symptoms. He continues to exercise regularly without problem.      Review of Systems   Constitution: Negative for weight gain and weight loss.   HENT: Negative.    Eyes: Negative.    Cardiovascular: Negative for chest pain, claudication, cyanosis, dyspnea on exertion, irregular heartbeat, leg swelling, near-syncope, orthopnea (no PND), palpitations and syncope.   Respiratory: Negative for cough, hemoptysis, shortness of breath and snoring.    Endocrine: Negative.    Skin: Negative.    Musculoskeletal: Negative for joint pain, muscle cramps, muscle weakness and myalgias.   Gastrointestinal: Negative for diarrhea, hematemesis, nausea and vomiting.   Genitourinary: Negative.    Neurological: Negative for dizziness, focal weakness, light-headedness, loss of balance, numbness, paresthesias and seizures.   Psychiatric/Behavioral: Negative.         Objective:    Physical Exam   Constitutional: He is oriented to person, place, and time. He appears well-developed and well-nourished.   HENT:   Mouth/Throat: Oropharynx is clear and moist.   Eyes: Pupils are equal, round, and reactive to light.   Neck: Normal range of motion. No thyromegaly present.   Cardiovascular: Normal rate, regular rhythm, S1 normal, S2 normal, normal heart sounds, intact distal pulses and normal pulses.  No extrasystoles are present. PMI is not displaced. Exam reveals no friction rub.   No murmur heard.  Pulmonary/Chest: Effort normal and breath sounds  normal. He has no wheezes. He has no rales. He exhibits no tenderness.   Abdominal: Soft. Bowel sounds are normal. He exhibits no distension and no mass. There is no abdominal tenderness.   Musculoskeletal: Normal range of motion.         General: No edema.   Neurological: He is alert and oriented to person, place, and time.   Skin: Skin is warm and dry.   Vitals reviewed.      ECG - Normal  Assessment:       1. Essential hypertension         Plan:       We discussed his BP   We discussed diet and weight loss  Start lisinopril 5 mg daily  Echo  Keep BP log  F/u 1 month

## 2020-10-21 ENCOUNTER — OFFICE VISIT (OUTPATIENT)
Dept: FAMILY MEDICINE | Facility: CLINIC | Age: 40
End: 2020-10-21
Payer: COMMERCIAL

## 2020-10-21 VITALS
DIASTOLIC BLOOD PRESSURE: 76 MMHG | HEART RATE: 71 BPM | WEIGHT: 264.31 LBS | SYSTOLIC BLOOD PRESSURE: 124 MMHG | OXYGEN SATURATION: 97 % | TEMPERATURE: 98 F | BODY MASS INDEX: 36.87 KG/M2

## 2020-10-21 DIAGNOSIS — B00.1 COLD SORE: Primary | ICD-10-CM

## 2020-10-21 PROCEDURE — 90686 IIV4 VACC NO PRSV 0.5 ML IM: CPT | Mod: S$GLB,,, | Performed by: PHYSICIAN ASSISTANT

## 2020-10-21 PROCEDURE — 99214 OFFICE O/P EST MOD 30 MIN: CPT | Mod: 25,S$GLB,, | Performed by: PHYSICIAN ASSISTANT

## 2020-10-21 PROCEDURE — 90471 IMMUNIZATION ADMIN: CPT | Mod: S$GLB,,, | Performed by: PHYSICIAN ASSISTANT

## 2020-10-21 PROCEDURE — 99999 PR PBB SHADOW E&M-EST. PATIENT-LVL III: ICD-10-PCS | Mod: PBBFAC,,, | Performed by: PHYSICIAN ASSISTANT

## 2020-10-21 PROCEDURE — 99214 PR OFFICE/OUTPT VISIT, EST, LEVL IV, 30-39 MIN: ICD-10-PCS | Mod: 25,S$GLB,, | Performed by: PHYSICIAN ASSISTANT

## 2020-10-21 PROCEDURE — 90686 FLU VACCINE (QUAD) GREATER THAN OR EQUAL TO 3YO PRESERVATIVE FREE IM: ICD-10-PCS | Mod: S$GLB,,, | Performed by: PHYSICIAN ASSISTANT

## 2020-10-21 PROCEDURE — 90471 FLU VACCINE (QUAD) GREATER THAN OR EQUAL TO 3YO PRESERVATIVE FREE IM: ICD-10-PCS | Mod: S$GLB,,, | Performed by: PHYSICIAN ASSISTANT

## 2020-10-21 PROCEDURE — 99999 PR PBB SHADOW E&M-EST. PATIENT-LVL III: CPT | Mod: PBBFAC,,, | Performed by: PHYSICIAN ASSISTANT

## 2020-10-21 RX ORDER — VALACYCLOVIR HYDROCHLORIDE 1 G/1
2000 TABLET, FILM COATED ORAL 2 TIMES DAILY
Qty: 12 TABLET | Refills: 2 | Status: SHIPPED | OUTPATIENT
Start: 2020-10-21 | End: 2022-10-19

## 2020-10-21 NOTE — PROGRESS NOTES
Subjective:       Patient ID: Seymour Park is a 40 y.o. male    Chief Complaint: Mouth Lesions    HPI  The patient is new to me, PCP is Dr Jovel.  He presents today CO a fever blister on his lip.  This is the second time that this has occurred this year.  He admits that he has been under a lot of stress lately.      Review of Systems   Constitutional: Negative for chills and fever.   Respiratory: Negative for shortness of breath.    Cardiovascular: Negative for chest pain.   Gastrointestinal: Negative for abdominal distention, diarrhea, nausea and vomiting.   Musculoskeletal: Negative for back pain.   Skin: Positive for rash. Negative for wound.   Neurological: Negative for headaches.   Psychiatric/Behavioral: Negative for self-injury.        Objective:   Physical Exam  Constitutional:       Appearance: He is well-developed.   HENT:      Head: Normocephalic and atraumatic.   Eyes:      Conjunctiva/sclera: Conjunctivae normal.      Pupils: Pupils are equal, round, and reactive to light.   Neck:      Musculoskeletal: Normal range of motion and neck supple.   Cardiovascular:      Rate and Rhythm: Normal rate and regular rhythm.   Pulmonary:      Effort: Pulmonary effort is normal.   Musculoskeletal: Normal range of motion.         General: No tenderness or deformity.   Skin:     General: Skin is warm and dry.      Findings: No rash.      Comments: The upper lip shows edema and erythema, no ulceration present   Neurological:      Mental Status: He is alert and oriented to person, place, and time.   Psychiatric:         Behavior: Behavior normal.         Thought Content: Thought content normal.         Judgment: Judgment normal.          Assessment:       1. Cold sore  valACYclovir (VALTREX) 1000 MG tablet        Plan:       Cold sore  -     valACYclovir (VALTREX) 1000 MG tablet; Take 2 tablets (2,000 mg total) by mouth 2 (two) times daily. for 1 day  Dispense: 12 tablet; Refill: 2    Other orders  -     Influenza -  Quadrivalent (PF)

## 2020-10-30 ENCOUNTER — HOSPITAL ENCOUNTER (OUTPATIENT)
Dept: RADIOLOGY | Facility: HOSPITAL | Age: 40
Discharge: HOME | End: 2020-10-30
Attending: NURSE PRACTITIONER
Payer: COMMERCIAL

## 2020-10-30 DIAGNOSIS — K22.2 SCHATZKI'S RING: ICD-10-CM

## 2020-10-30 DIAGNOSIS — R13.14 PHARYNGOESOPHAGEAL DYSPHAGIA: ICD-10-CM

## 2020-10-30 PROCEDURE — 25500020 PHARM REV CODE 255: Mod: PO | Performed by: NURSE PRACTITIONER

## 2020-10-30 PROCEDURE — 74220 X-RAY XM ESOPHAGUS 1CNTRST: CPT | Mod: TC,PO

## 2020-10-30 PROCEDURE — 74220 FL ESOPHAGRAM WITH BARIUM TABLET: ICD-10-PCS | Mod: 26,,, | Performed by: RADIOLOGY

## 2020-10-30 PROCEDURE — 74220 X-RAY XM ESOPHAGUS 1CNTRST: CPT | Mod: 26,,, | Performed by: RADIOLOGY

## 2020-10-30 PROCEDURE — A9698 NON-RAD CONTRAST MATERIALNOC: HCPCS | Mod: PO | Performed by: NURSE PRACTITIONER

## 2020-10-30 RX ADMIN — Medication: at 09:10

## 2020-11-02 ENCOUNTER — CLINICAL SUPPORT (OUTPATIENT)
Dept: CARDIOLOGY | Facility: CLINIC | Age: 40
End: 2020-11-02
Attending: INTERNAL MEDICINE
Payer: COMMERCIAL

## 2020-11-02 VITALS — HEART RATE: 69 BPM | HEIGHT: 71 IN | BODY MASS INDEX: 37 KG/M2 | WEIGHT: 264.31 LBS

## 2020-11-02 DIAGNOSIS — I10 ESSENTIAL HYPERTENSION: ICD-10-CM

## 2020-11-02 PROCEDURE — 93306 TTE W/DOPPLER COMPLETE: CPT | Mod: S$GLB,,, | Performed by: INTERNAL MEDICINE

## 2020-11-02 PROCEDURE — 99999 PR PBB SHADOW E&M-EST. PATIENT-LVL II: ICD-10-PCS | Mod: PBBFAC,,,

## 2020-11-02 PROCEDURE — 99999 PR PBB SHADOW E&M-EST. PATIENT-LVL II: CPT | Mod: PBBFAC,,,

## 2020-11-02 PROCEDURE — 93306 ECHO (CUPID ONLY): ICD-10-PCS | Mod: S$GLB,,, | Performed by: INTERNAL MEDICINE

## 2020-11-03 ENCOUNTER — TELEPHONE (OUTPATIENT)
Dept: GASTROENTEROLOGY | Facility: CLINIC | Age: 40
End: 2020-11-03

## 2020-11-03 LAB
ASCENDING AORTA: 2.73 CM
AV INDEX (PROSTH): 0.8
AV MEAN GRADIENT: 3 MMHG
AV PEAK GRADIENT: 6 MMHG
AV VALVE AREA: 3.59 CM2
AV VELOCITY RATIO: 0.8
BSA FOR ECHO PROCEDURE: 2.45 M2
CV ECHO LV RWT: 0.34 CM
DOP CALC AO PEAK VEL: 1.24 M/S
DOP CALC AO VTI: 23.57 CM
DOP CALC LVOT AREA: 4.5 CM2
DOP CALC LVOT DIAMETER: 2.39 CM
DOP CALC LVOT PEAK VEL: 0.99 M/S
DOP CALC LVOT STROKE VOLUME: 84.7 CM3
DOP CALCLVOT PEAK VEL VTI: 18.89 CM
E WAVE DECELERATION TIME: 214.1 MSEC
E/A RATIO: 0.83
E/E' RATIO: 6.11 M/S
ECHO LV POSTERIOR WALL: 0.91 CM (ref 0.6–1.1)
FRACTIONAL SHORTENING: 32 % (ref 28–44)
INTERVENTRICULAR SEPTUM: 0.89 CM (ref 0.6–1.1)
IVRT: 102.76 MSEC
LA MAJOR: 5.36 CM
LA MINOR: 4.83 CM
LA WIDTH: 3.96 CM
LEFT ATRIUM SIZE: 4.37 CM
LEFT ATRIUM VOLUME INDEX: 31.5 ML/M2
LEFT ATRIUM VOLUME: 74.74 CM3
LEFT INTERNAL DIMENSION IN SYSTOLE: 3.62 CM (ref 2.1–4)
LEFT VENTRICLE DIASTOLIC VOLUME INDEX: 56.72 ML/M2
LEFT VENTRICLE DIASTOLIC VOLUME: 134.64 ML
LEFT VENTRICLE MASS INDEX: 73 G/M2
LEFT VENTRICLE SYSTOLIC VOLUME INDEX: 23.3 ML/M2
LEFT VENTRICLE SYSTOLIC VOLUME: 55.29 ML
LEFT VENTRICULAR INTERNAL DIMENSION IN DIASTOLE: 5.29 CM (ref 3.5–6)
LEFT VENTRICULAR MASS: 173.96 G
LV LATERAL E/E' RATIO: 5 M/S
LV SEPTAL E/E' RATIO: 7.86 M/S
MV PEAK A VEL: 0.66 M/S
MV PEAK E VEL: 0.55 M/S
MV STENOSIS PRESSURE HALF TIME: 62.09 MS
MV VALVE AREA P 1/2 METHOD: 3.54 CM2
PULM VEIN S/D RATIO: 1.32
PV PEAK D VEL: 0.44 M/S
PV PEAK S VEL: 0.58 M/S
RA MAJOR: 4.93 CM
RA WIDTH: 3.15 CM
RIGHT VENTRICULAR END-DIASTOLIC DIMENSION: 3.42 CM
SINUS: 3.06 CM
STJ: 2.76 CM
TDI LATERAL: 0.11 M/S
TDI SEPTAL: 0.07 M/S
TDI: 0.09 M/S
TRICUSPID ANNULAR PLANE SYSTOLIC EXCURSION: 1.78 CM

## 2020-11-03 NOTE — TELEPHONE ENCOUNTER
"Please call to inform & review the results with the patient- radiology report of the esophagram with barium tablet showed "The patient is status post gastric sleeve procedure with what is believed to represent a small hiatal hernia.  With provocative maneuvers, specifically coughing and leg lifts, there was a small amount of gastroesophageal reflux."    Continue with previous recommendations, including completing modified barium swallow study if dysphagia has persisted. Continue GERD recommendations, especially avoid laying flat for at least 3 hours after eating and keep head of bed elevated by 6 inches.    If no improvement in symptoms or symptoms worsen, call/follow-up at clinic or go to ER.  Please release results to patient's mychart once you have discussed results and recommendations with patient.  Thanks,    "

## 2020-11-04 ENCOUNTER — PATIENT MESSAGE (OUTPATIENT)
Dept: CARDIOLOGY | Facility: CLINIC | Age: 40
End: 2020-11-04

## 2020-11-04 ENCOUNTER — TELEPHONE (OUTPATIENT)
Dept: CARDIOLOGY | Facility: CLINIC | Age: 40
End: 2020-11-04

## 2020-11-04 ENCOUNTER — TELEPHONE (OUTPATIENT)
Dept: GASTROENTEROLOGY | Facility: CLINIC | Age: 40
End: 2020-11-04

## 2020-11-04 NOTE — TELEPHONE ENCOUNTER
Test(s) Reviewed  I have reviewed the following in detail:  [] Stress test   [] Angiography   [x] Echocardiogram   [] Labs   [] Other:       Call Pt and tell him Echo is normal  Keep appt 11/13

## 2020-11-04 NOTE — TELEPHONE ENCOUNTER
----- Message from LANG Gutierrez sent at 11/4/2020  9:54 AM CST -----  Please call to inform & review the results with the patient- Lab results showed normal liver function levels; negative hepatitis panel; normal vitamin B12 & magnesium levels; unremarkable electrolytes and kidney function levels; and blood counts showed no anemia.  Continue with previous recommendations.  Thanks,  Pamela ATKINSONP-C

## 2020-11-12 ENCOUNTER — PATIENT OUTREACH (OUTPATIENT)
Dept: ADMINISTRATIVE | Facility: OTHER | Age: 40
End: 2020-11-12

## 2020-11-12 NOTE — PROGRESS NOTES
LINKS immunization registry not responding  Care Everywhere updated  Health Maintenance updated  Chart reviewed for overdue Proactive [facility] Encounters (JARETT) health maintenance testing (CRS, Breast Ca, Diabetic Eye Exam)   Orders entered:N/A

## 2020-11-13 ENCOUNTER — OFFICE VISIT (OUTPATIENT)
Dept: CARDIOLOGY | Facility: CLINIC | Age: 40
End: 2020-11-13
Payer: COMMERCIAL

## 2020-11-13 ENCOUNTER — TELEPHONE (OUTPATIENT)
Dept: CARDIOLOGY | Facility: CLINIC | Age: 40
End: 2020-11-13

## 2020-11-13 VITALS
BODY MASS INDEX: 36.67 KG/M2 | HEIGHT: 71 IN | HEART RATE: 69 BPM | WEIGHT: 261.94 LBS | SYSTOLIC BLOOD PRESSURE: 120 MMHG | DIASTOLIC BLOOD PRESSURE: 78 MMHG

## 2020-11-13 DIAGNOSIS — I10 ESSENTIAL HYPERTENSION: Primary | ICD-10-CM

## 2020-11-13 PROCEDURE — 99999 PR PBB SHADOW E&M-EST. PATIENT-LVL III: CPT | Mod: PBBFAC,,, | Performed by: INTERNAL MEDICINE

## 2020-11-13 PROCEDURE — 99999 PR PBB SHADOW E&M-EST. PATIENT-LVL III: ICD-10-PCS | Mod: PBBFAC,,, | Performed by: INTERNAL MEDICINE

## 2020-11-13 PROCEDURE — 99214 OFFICE O/P EST MOD 30 MIN: CPT | Mod: S$GLB,,, | Performed by: INTERNAL MEDICINE

## 2020-11-13 PROCEDURE — 99214 PR OFFICE/OUTPT VISIT, EST, LEVL IV, 30-39 MIN: ICD-10-PCS | Mod: S$GLB,,, | Performed by: INTERNAL MEDICINE

## 2020-11-13 RX ORDER — LOSARTAN POTASSIUM 25 MG/1
25 TABLET ORAL DAILY
Qty: 90 TABLET | Refills: 3 | Status: SHIPPED | OUTPATIENT
Start: 2020-11-13 | End: 2021-07-27

## 2020-11-13 NOTE — TELEPHONE ENCOUNTER
Spoke to pt and advised pt is changing from lisinopril to losartan and pt is aware of which medication to take; she expressed understanding

## 2020-11-13 NOTE — TELEPHONE ENCOUNTER
----- Message from Earlene Li sent at 11/13/2020  9:46 AM CST -----  Regarding: Pharmacy call  Contact: Sujey with [facility]  Type:  Pharmacy Calling to Clarify an RX    Name of Caller:  Marianne  Pharmacy Name:  [facility]  Prescription Name:  Lasartin  What do they need to clarify?:  stopoing Lisinipril and starting the Lasartin  Best Call Back Number: [phone]  Additional Information:  would like to verify if med is being changed

## 2020-11-13 NOTE — PROGRESS NOTES
Subjective:    Patient ID:  Seymour Park is a 40 y.o. male who presents for follow-up of Hypertension      Pt here for f/u. Seen last month we started lisinopril 5 mg for BP. His log shows good control since starting med. He reports he developed a dry cough about a week after starting. Reports no other associated symptoms to suggest URI. He has been working on diet and has lost 10#.       Review of Systems   Constitution: Negative for fever, malaise/fatigue, weight gain and weight loss.   HENT: Negative.    Eyes: Negative.    Cardiovascular: Negative for chest pain, claudication, cyanosis, dyspnea on exertion, irregular heartbeat, leg swelling, near-syncope, orthopnea (no PND) and palpitations.   Respiratory: Positive for cough. Negative for hemoptysis, shortness of breath, snoring and sputum production.    Endocrine: Negative.    Skin: Negative.    Musculoskeletal: Negative for joint pain, muscle cramps, muscle weakness and myalgias.   Gastrointestinal: Negative for diarrhea, hematemesis, nausea and vomiting.   Genitourinary: Negative.    Neurological: Negative for dizziness, focal weakness, light-headedness, loss of balance, numbness, paresthesias and seizures.   Psychiatric/Behavioral: Negative.         Objective:    Physical Exam   Constitutional: He is oriented to person, place, and time. He appears well-developed and well-nourished.   HENT:   Mouth/Throat: Oropharynx is clear and moist.   Eyes: Pupils are equal, round, and reactive to light.   Neck: Normal range of motion. No thyromegaly present.   Cardiovascular: Normal rate, regular rhythm, S1 normal, S2 normal, normal heart sounds, intact distal pulses and normal pulses.  No extrasystoles are present. PMI is not displaced. Exam reveals no friction rub.   No murmur heard.  Pulmonary/Chest: Effort normal and breath sounds normal. He has no wheezes. He has no rales. He exhibits no tenderness.   Abdominal: Soft. Bowel sounds are normal. He exhibits no  distension and no mass. There is no abdominal tenderness.   Musculoskeletal: Normal range of motion.         General: No edema.   Neurological: He is alert and oriented to person, place, and time.   Skin: Skin is warm and dry.   Vitals reviewed.      Test(s) Reviewed  I have reviewed the following in detail:  [] Stress test   [] Angiography   [x] Echocardiogram   [x] Labs   [] Other:         Assessment:       1. Essential hypertension         Plan:       BP has been well controlled since starting lisinopril but believe the cough is ACE cough  We discussed his normal Echo  Will change med to losartan 25 mg daily  Continue to monitor and log BP  If cough does not resolve f/u with PCP

## 2021-05-06 ENCOUNTER — PATIENT MESSAGE (OUTPATIENT)
Dept: RESEARCH | Facility: HOSPITAL | Age: 41
End: 2021-05-06

## 2021-11-02 ENCOUNTER — OFFICE VISIT (OUTPATIENT)
Dept: CARDIOLOGY | Facility: CLINIC | Age: 41
End: 2021-11-02
Payer: COMMERCIAL

## 2021-11-02 VITALS
WEIGHT: 262.13 LBS | HEART RATE: 72 BPM | HEIGHT: 70 IN | DIASTOLIC BLOOD PRESSURE: 78 MMHG | BODY MASS INDEX: 37.53 KG/M2 | OXYGEN SATURATION: 97 % | SYSTOLIC BLOOD PRESSURE: 110 MMHG

## 2021-11-02 DIAGNOSIS — I10 ESSENTIAL HYPERTENSION: ICD-10-CM

## 2021-11-02 DIAGNOSIS — I10 PRIMARY HYPERTENSION: Primary | ICD-10-CM

## 2021-11-02 PROCEDURE — 93005 ELECTROCARDIOGRAM TRACING: CPT | Mod: PO

## 2021-11-02 PROCEDURE — 99214 OFFICE O/P EST MOD 30 MIN: CPT | Mod: S$GLB,,, | Performed by: INTERNAL MEDICINE

## 2021-11-02 PROCEDURE — 99214 PR OFFICE/OUTPT VISIT, EST, LEVL IV, 30-39 MIN: ICD-10-PCS | Mod: S$GLB,,, | Performed by: INTERNAL MEDICINE

## 2021-11-02 PROCEDURE — 99999 PR PBB SHADOW E&M-EST. PATIENT-LVL III: ICD-10-PCS | Mod: PBBFAC,,, | Performed by: INTERNAL MEDICINE

## 2021-11-02 PROCEDURE — 99999 PR PBB SHADOW E&M-EST. PATIENT-LVL III: CPT | Mod: PBBFAC,,, | Performed by: INTERNAL MEDICINE

## 2021-11-02 PROCEDURE — 93010 ELECTROCARDIOGRAM REPORT: CPT | Mod: S$GLB,,, | Performed by: INTERNAL MEDICINE

## 2021-11-02 PROCEDURE — 93010 EKG 12-LEAD: ICD-10-PCS | Mod: S$GLB,,, | Performed by: INTERNAL MEDICINE

## 2021-11-02 RX ORDER — LOSARTAN POTASSIUM 25 MG/1
25 TABLET ORAL DAILY
Qty: 90 TABLET | Refills: 3 | Status: SHIPPED | OUTPATIENT
Start: 2021-11-02 | End: 2022-10-23

## 2021-11-04 ENCOUNTER — LAB VISIT (OUTPATIENT)
Dept: LAB | Facility: HOSPITAL | Age: 41
End: 2021-11-04
Attending: INTERNAL MEDICINE
Payer: COMMERCIAL

## 2021-11-04 DIAGNOSIS — I10 PRIMARY HYPERTENSION: ICD-10-CM

## 2021-11-04 LAB
ALBUMIN SERPL BCP-MCNC: 3.9 G/DL (ref 3.5–5.2)
ALP SERPL-CCNC: 78 U/L (ref 55–135)
ALT SERPL W/O P-5'-P-CCNC: 283 U/L (ref 10–44)
ANION GAP SERPL CALC-SCNC: 9 MMOL/L (ref 8–16)
AST SERPL-CCNC: 128 U/L (ref 10–40)
BASOPHILS # BLD AUTO: 0.09 K/UL (ref 0–0.2)
BASOPHILS NFR BLD: 1.6 % (ref 0–1.9)
BILIRUB SERPL-MCNC: 0.9 MG/DL (ref 0.1–1)
BUN SERPL-MCNC: 19 MG/DL (ref 6–20)
CALCIUM SERPL-MCNC: 9.9 MG/DL (ref 8.7–10.5)
CHLORIDE SERPL-SCNC: 105 MMOL/L (ref 95–110)
CHOLEST SERPL-MCNC: 192 MG/DL (ref 120–199)
CHOLEST/HDLC SERPL: 4.6 {RATIO} (ref 2–5)
CO2 SERPL-SCNC: 27 MMOL/L (ref 23–29)
CREAT SERPL-MCNC: 1.1 MG/DL (ref 0.5–1.4)
DIFFERENTIAL METHOD: NORMAL
EOSINOPHIL # BLD AUTO: 0.3 K/UL (ref 0–0.5)
EOSINOPHIL NFR BLD: 5.6 % (ref 0–8)
ERYTHROCYTE [DISTWIDTH] IN BLOOD BY AUTOMATED COUNT: 14.4 % (ref 11.5–14.5)
EST. GFR  (AFRICAN AMERICAN): >60 ML/MIN/1.73 M^2
EST. GFR  (NON AFRICAN AMERICAN): >60 ML/MIN/1.73 M^2
ESTIMATED AVG GLUCOSE: 111 MG/DL (ref 68–131)
GLUCOSE SERPL-MCNC: 90 MG/DL (ref 70–110)
HBA1C MFR BLD: 5.5 % (ref 4–5.6)
HCT VFR BLD AUTO: 48.8 % (ref 40–54)
HDLC SERPL-MCNC: 42 MG/DL (ref 40–75)
HDLC SERPL: 21.9 % (ref 20–50)
HGB BLD-MCNC: 15.9 G/DL (ref 14–18)
IMM GRANULOCYTES # BLD AUTO: 0.02 K/UL (ref 0–0.04)
IMM GRANULOCYTES NFR BLD AUTO: 0.4 % (ref 0–0.5)
LDLC SERPL CALC-MCNC: 119 MG/DL (ref 63–159)
LYMPHOCYTES # BLD AUTO: 2.3 K/UL (ref 1–4.8)
LYMPHOCYTES NFR BLD: 40.7 % (ref 18–48)
MAGNESIUM SERPL-MCNC: 2.1 MG/DL (ref 1.6–2.6)
MCH RBC QN AUTO: 27.6 PG (ref 27–31)
MCHC RBC AUTO-ENTMCNC: 32.6 G/DL (ref 32–36)
MCV RBC AUTO: 85 FL (ref 82–98)
MONOCYTES # BLD AUTO: 0.5 K/UL (ref 0.3–1)
MONOCYTES NFR BLD: 9.7 % (ref 4–15)
NEUTROPHILS # BLD AUTO: 2.4 K/UL (ref 1.8–7.7)
NEUTROPHILS NFR BLD: 42 % (ref 38–73)
NONHDLC SERPL-MCNC: 150 MG/DL
NRBC BLD-RTO: 0 /100 WBC
PLATELET # BLD AUTO: 270 K/UL (ref 150–450)
PMV BLD AUTO: 10.9 FL (ref 9.2–12.9)
POTASSIUM SERPL-SCNC: 4.8 MMOL/L (ref 3.5–5.1)
PROT SERPL-MCNC: 7.8 G/DL (ref 6–8.4)
RBC # BLD AUTO: 5.77 M/UL (ref 4.6–6.2)
SODIUM SERPL-SCNC: 141 MMOL/L (ref 136–145)
TRIGL SERPL-MCNC: 155 MG/DL (ref 30–150)
VIT B12 SERPL-MCNC: 642 PG/ML (ref 210–950)
WBC # BLD AUTO: 5.58 K/UL (ref 3.9–12.7)

## 2021-11-04 PROCEDURE — 36415 COLL VENOUS BLD VENIPUNCTURE: CPT | Mod: PO | Performed by: INTERNAL MEDICINE

## 2021-11-04 PROCEDURE — 85025 COMPLETE CBC W/AUTO DIFF WBC: CPT | Performed by: INTERNAL MEDICINE

## 2021-11-04 PROCEDURE — 80053 COMPREHEN METABOLIC PANEL: CPT | Performed by: INTERNAL MEDICINE

## 2021-11-04 PROCEDURE — 80061 LIPID PANEL: CPT | Performed by: INTERNAL MEDICINE

## 2021-11-04 PROCEDURE — 83036 HEMOGLOBIN GLYCOSYLATED A1C: CPT | Performed by: INTERNAL MEDICINE

## 2021-11-04 PROCEDURE — 83735 ASSAY OF MAGNESIUM: CPT | Performed by: INTERNAL MEDICINE

## 2021-11-04 PROCEDURE — 82607 VITAMIN B-12: CPT | Performed by: INTERNAL MEDICINE

## 2022-02-28 ENCOUNTER — PATIENT MESSAGE (OUTPATIENT)
Dept: ADMINISTRATIVE | Facility: HOSPITAL | Age: 42
End: 2022-02-28
Payer: COMMERCIAL

## 2022-05-31 ENCOUNTER — PATIENT MESSAGE (OUTPATIENT)
Dept: ADMINISTRATIVE | Facility: HOSPITAL | Age: 42
End: 2022-05-31
Payer: COMMERCIAL

## 2022-10-19 ENCOUNTER — OFFICE VISIT (OUTPATIENT)
Dept: FAMILY MEDICINE | Facility: CLINIC | Age: 42
End: 2022-10-19
Payer: COMMERCIAL

## 2022-10-19 VITALS
HEIGHT: 70 IN | BODY MASS INDEX: 39.05 KG/M2 | DIASTOLIC BLOOD PRESSURE: 88 MMHG | TEMPERATURE: 98 F | WEIGHT: 272.81 LBS | RESPIRATION RATE: 19 BRPM | OXYGEN SATURATION: 98 % | SYSTOLIC BLOOD PRESSURE: 136 MMHG | HEART RATE: 65 BPM

## 2022-10-19 DIAGNOSIS — L03.90 CELLULITIS, UNSPECIFIED CELLULITIS SITE: Primary | ICD-10-CM

## 2022-10-19 PROCEDURE — 99999 PR PBB SHADOW E&M-EST. PATIENT-LVL IV: ICD-10-PCS | Mod: PBBFAC,,, | Performed by: NURSE PRACTITIONER

## 2022-10-19 PROCEDURE — 99999 PR PBB SHADOW E&M-EST. PATIENT-LVL IV: CPT | Mod: PBBFAC,,, | Performed by: NURSE PRACTITIONER

## 2022-10-19 PROCEDURE — 96372 THER/PROPH/DIAG INJ SC/IM: CPT | Mod: S$GLB,,, | Performed by: NURSE PRACTITIONER

## 2022-10-19 PROCEDURE — 99214 PR OFFICE/OUTPT VISIT, EST, LEVL IV, 30-39 MIN: ICD-10-PCS | Mod: 25,S$GLB,, | Performed by: NURSE PRACTITIONER

## 2022-10-19 PROCEDURE — 96372 PR INJECTION,THERAP/PROPH/DIAG2ST, IM OR SUBCUT: ICD-10-PCS | Mod: S$GLB,,, | Performed by: NURSE PRACTITIONER

## 2022-10-19 PROCEDURE — 99214 OFFICE O/P EST MOD 30 MIN: CPT | Mod: 25,S$GLB,, | Performed by: NURSE PRACTITIONER

## 2022-10-19 RX ORDER — CEFTRIAXONE 1 G/1
1 INJECTION, POWDER, FOR SOLUTION INTRAMUSCULAR; INTRAVENOUS
Status: COMPLETED | OUTPATIENT
Start: 2022-10-19 | End: 2022-10-19

## 2022-10-19 RX ORDER — CLINDAMYCIN HYDROCHLORIDE 300 MG/1
300 CAPSULE ORAL EVERY 8 HOURS
Qty: 21 CAPSULE | Refills: 0 | Status: SHIPPED | OUTPATIENT
Start: 2022-10-19 | End: 2022-10-26

## 2022-10-19 RX ADMIN — CEFTRIAXONE 1 G: 1 INJECTION, POWDER, FOR SOLUTION INTRAMUSCULAR; INTRAVENOUS at 10:10

## 2022-10-19 NOTE — PROGRESS NOTES
Subjective:       Patient ID: Seymour Park is a 42 y.o. male.    Chief Complaint: Recurrent Skin Infections (Recurrent cellulitis in scar on right side of the under belly, sx for 4 days)    HPI  New patient to me presents for skin infection  History of recurrent infections to lower abdominal scar (from cosmetic skin removal procedure >10 years ago)  Symptoms started 4 days ago--started old Rx clindamycin 300mg TID --has taken for 2 days. Symptoms are not worsening. Denies fever or drainage    Vitals:    10/19/22 0901   BP: 136/88   Pulse: 65   Resp: 19   Temp: 97.8 °F (36.6 °C)     Review of Systems   Constitutional:  Negative for diaphoresis and fever.   HENT:  Negative for facial swelling and trouble swallowing.    Respiratory:  Negative for cough and shortness of breath.    Cardiovascular:  Negative for chest pain.   Gastrointestinal:  Negative for diarrhea.   Genitourinary:  Negative for difficulty urinating.   Musculoskeletal:  Negative for gait problem.   Skin:  Positive for color change. Negative for pallor and rash.   Neurological:  Negative for facial asymmetry and speech difficulty.   Psychiatric/Behavioral:  Negative for confusion. The patient is not nervous/anxious.      Past Medical History:   Diagnosis Date    Cholelithiasis     Colon polyp     Fatty liver     Gout     Hepatomegaly     History of bariatric surgery     Obesity     Tobacco use      Objective:      Physical Exam  Constitutional:       General: He is not in acute distress.     Appearance: He is well-developed. He is not ill-appearing, toxic-appearing or diaphoretic.   HENT:      Right Ear: Hearing normal.      Left Ear: Hearing normal.   Pulmonary:      Effort: No tachypnea or respiratory distress.   Abdominal:          Comments: Extensive induration, erythema and warmth   No drainable abscess    Skin:     Coloration: Skin is not pale.   Neurological:      Mental Status: He is alert and oriented to person, place, and time.   Psychiatric:          Speech: Speech normal.         Behavior: Behavior normal.         Thought Content: Thought content normal.         Judgment: Judgment normal.       Assessment:       1. Cellulitis, unspecified cellulitis site        Plan:       Cellulitis, unspecified cellulitis site  -     cefTRIAXone injection 1 g  -     clindamycin (CLEOCIN) 300 MG capsule; Take 1 capsule (300 mg total) by mouth every 8 (eight) hours. for 7 days  Dispense: 21 capsule; Refill: 0          Follow up for further evaluation if s/s worsen, fail to improve, or new symptoms arise.    Medication List with Changes/Refills   New Medications    CLINDAMYCIN (CLEOCIN) 300 MG CAPSULE    Take 1 capsule (300 mg total) by mouth every 8 (eight) hours. for 7 days   Current Medications    LOSARTAN (COZAAR) 25 MG TABLET    Take 1 tablet (25 mg total) by mouth once daily.    PANTOPRAZOLE (PROTONIX) 40 MG TABLET    TAKE 1 TABLET BY MOUTH EVERY DAY IN THE MORNING    VALACYCLOVIR (VALTREX) 1000 MG TABLET    Take 2 tablets (2,000 mg total) by mouth 2 (two) times daily. for 1 day

## 2022-11-09 DIAGNOSIS — I10 PRIMARY HYPERTENSION: ICD-10-CM

## 2023-07-25 DIAGNOSIS — R12 HEARTBURN: ICD-10-CM

## 2023-07-25 RX ORDER — PANTOPRAZOLE SODIUM 40 MG/1
TABLET, DELAYED RELEASE ORAL
Qty: 90 TABLET | Refills: 3 | Status: SHIPPED | OUTPATIENT
Start: 2023-07-25

## 2024-11-15 ENCOUNTER — HOSPITAL ENCOUNTER (OUTPATIENT)
Dept: RADIOLOGY | Facility: HOSPITAL | Age: 44
Discharge: HOME | End: 2024-11-15
Attending: STUDENT IN AN ORGANIZED HEALTH CARE EDUCATION/TRAINING PROGRAM
Payer: COMMERCIAL

## 2024-11-15 ENCOUNTER — OFFICE VISIT (OUTPATIENT)
Dept: ORTHOPEDICS | Facility: CLINIC | Age: 44
End: 2024-11-15
Payer: COMMERCIAL

## 2024-11-15 VITALS — HEIGHT: 70 IN | WEIGHT: 272.94 LBS | BODY MASS INDEX: 39.07 KG/M2

## 2024-11-15 DIAGNOSIS — M25.521 RIGHT ELBOW PAIN: Primary | ICD-10-CM

## 2024-11-15 DIAGNOSIS — M10.9 GOUT, UNSPECIFIED CAUSE, UNSPECIFIED CHRONICITY, UNSPECIFIED SITE: Primary | ICD-10-CM

## 2024-11-15 DIAGNOSIS — M70.21 OLECRANON BURSITIS OF RIGHT ELBOW: ICD-10-CM

## 2024-11-15 DIAGNOSIS — M25.521 RIGHT ELBOW PAIN: ICD-10-CM

## 2024-11-15 PROCEDURE — 73080 X-RAY EXAM OF ELBOW: CPT | Mod: 26,RT,, | Performed by: STUDENT IN AN ORGANIZED HEALTH CARE EDUCATION/TRAINING PROGRAM

## 2024-11-15 PROCEDURE — 73080 X-RAY EXAM OF ELBOW: CPT | Mod: TC,PO,RT

## 2024-11-15 PROCEDURE — 99999 PR PBB SHADOW E&M-EST. PATIENT-LVL III: CPT | Mod: PBBFAC,,, | Performed by: STUDENT IN AN ORGANIZED HEALTH CARE EDUCATION/TRAINING PROGRAM

## 2024-11-15 NOTE — PROGRESS NOTES
Patient ID: Seymour Park  YOB: 1980  MRN: [id number]    Chief Complaint: Pain of the Right Elbow      Referred By: self    History of Present Illness: Seymour Park is a right-hand dominant 44 y.o. male who presents today with right elbow pain. Mr. Park has been having persistent right elbow pain for about a month. He reports he has fought with gout since in his early 20's and that this feels like a gout flare up. It's not really a pain, just a nagging ache that in constant and is real tender in the joint to touch. Worse at night. Xrays were taken today.            Past Medical History:   Past Medical History:   Diagnosis Date    Cholelithiasis     Colon polyp     Fatty liver     Gout     Hepatomegaly     History of bariatric surgery     Obesity     Tobacco use      Past Surgical History:   Procedure Laterality Date    APPENDECTOMY  ~2012    COLONOSCOPY N/A 8/14/2020    Procedure: COLONOSCOPY;  Surgeon: Dwaine Murphy Jr., MD;  Location: [facility];  Service: Endoscopy;  Laterality: N/A; 9 colon polyps removed, Redundant colon. hemorrhoids; repeat in 5 years for surveillance; biopsy: polyps- tubular adenoma x1, others were hyperplastic polyps    ESOPHAGOGASTRODUODENOSCOPY N/A 8/14/2020    Dr. Murphy: Reflux vs stasis chronic esophagitis, Mild Schatzki ring (vs hypertonic LES) . Dilated, 51 Fr. sleeve gastrectomy was found, characterized by healthy appearing mucosa, single antrum polyp biopsied. antritis; biopsy: esophagus WNL, polyp-hyperplastic polyp(s), stomach- mild chronic gastritis, negative for h pylori    Gastric sleeve  01/2013    VASECTOMY       Family History   Problem Relation Name Age of Onset    Colon cancer Neg Hx      Crohn's disease Neg Hx      Esophageal cancer Neg Hx      Stomach cancer Neg Hx      Liver cancer Neg Hx      Liver disease Neg Hx       Social History     Socioeconomic History    Marital status:    Tobacco Use    Smoking status: Former     Current  packs/day: 0.00     Types: Cigarettes     Quit date: 2019     Years since quittin.3    Smokeless tobacco: Never   Substance and Sexual Activity    Alcohol use: Yes     Alcohol/week: 4.0 - 5.0 standard drinks of alcohol     Types: 4 - 5 Glasses of wine per week    Drug use: Not Currently     Comment: marijuana in college      Medication List with Changes/Refills   Current Medications    LOSARTAN (COZAAR) 25 MG TABLET    TAKE 1 TABLET BY MOUTH EVERY DAY    PANTOPRAZOLE (PROTONIX) 40 MG TABLET    TAKE 1 TABLET BY MOUTH EVERY DAY IN THE MORNING    VALACYCLOVIR (VALTREX) 1000 MG TABLET    Take 2 tablets (2,000 mg total) by mouth 2 (two) times daily. for 1 day     Review of patient's allergies indicates:  No Known Allergies    Physical Exam:   Body mass index is 39.16 kg/m².    GENERAL: Well appearing, in no acute distress.  HEAD: Normocephalic and atraumatic.  ENT: External ears and nose grossly normal.  EYES: EOMI bilaterally  PULMONARY: Respirations are grossly even and non-labored.  NEURO: Awake, alert, and oriented x 3.  SKIN: No obvious rashes appreciated.  PSYCH: Mood & affect are appropriate.    Detailed MSK exam:     Right elbow exam:   -TTP: Olecranon and Distal triceps insertion  -ROM: extension 0, flexion 130  -Resisted wrist extension: negative  -Resisted 3rd finger extension: negative  -Resisted wrist flexion: negative  -Resisted pronation: negative  -Resisted supination: negative  -Sensation intact  -Pulses 2+  - strength 5/5      Imaging:  X-Ray Elbow Complete 3 views Right  Narrative: EXAMINATION:  XR ELBOW COMPLETE 3 VIEW RIGHT    CLINICAL HISTORY:  Pain in right elbow    TECHNIQUE:  XR ELBOW COMPLETE 3 VIEW RIGHT    COMPARISON:  None.    FINDINGS:  No evidence of acute fracture or dislocation.  No significant joint effusion.  No radiopaque foreign body seen.  Impression: As above.    Electronically signed by: Quirino De Santiago  Date:    11/15/2024  Time:    10:39        Relevant imaging results  were reviewed and interpreted by me and per my read shows no acute abnormalities.  This was discussed with the patient and / or family today.     Assessment:  Seymour Park is a 44 y.o. male presenting with right elbow pain.   History, physical and radiographs are consistent with a likely diagnosis of olecranon bursitis possible gout etiology, possible triceps tendinopathy.   Plan: ice, voltaren gel. Symptoms have been improving. Referral to rheumatology d/t h/o multiple gout flares in the past. Consider steroid injection if olecranon bursitis flares up again but right now minimal to no swelling with mild TTP. Continue conservative management for pain.   Follow up as needed. All questions answered.      Gout, unspecified cause, unspecified chronicity, unspecified site  -     Ambulatory referral/consult to Rheumatology; Future; Expected date: 11/22/2024    Olecranon bursitis of right elbow             A copy of today's visit note has been sent to the referring provider.     Electronically signed:  Jason Juarez MD, MPH  11/15/2024  10:45 AM

## 2024-11-15 NOTE — PATIENT INSTRUCTIONS
Assessment:  Seymour Park is a 44 y.o. male   Chief Complaint   Patient presents with    Right Elbow - Pain       No diagnosis found.     Plan:  Referral to Dr. Nael, Rheumatology  Apply topical diclofenac (Voltaren) up to 4 times a day to the affected area.  It can be bought over the counter at any local pharmacy.    Patient may ice every 2 hours for 15 minutes as needed to control pain and swelling.   Follow up with Rheumatologist        Follow-up:  with Dr. Neal  .    Thank you for choosing [facility] and Dr. Jason Juarez for your orthopedic & sports medicine care. It is our goal to provide you with exceptional care that will help keep you healthy, active, and get you back in the game.    Please do not hesitate to reach out to us via email, phone, or MyChart with any questions, concerns, or feedback.    If you felt that you received exemplary care today, please consider leaving us feedback on [employer] at:  [email or web address]    If you are experiencing pain/discomfort ,or have questions after 5pm and would like to be connected to the [facility] on-call team, please call this number and specify which Sports Medicine provider is treating you: [phone]

## 2024-11-19 ENCOUNTER — OFFICE VISIT (OUTPATIENT)
Dept: RHEUMATOLOGY | Facility: CLINIC | Age: 44
End: 2024-11-19
Payer: COMMERCIAL

## 2024-11-19 ENCOUNTER — HOSPITAL ENCOUNTER (OUTPATIENT)
Dept: RADIOLOGY | Facility: HOSPITAL | Age: 44
Discharge: HOME | End: 2024-11-19
Attending: INTERNAL MEDICINE
Payer: COMMERCIAL

## 2024-11-19 ENCOUNTER — TELEPHONE (OUTPATIENT)
Dept: RHEUMATOLOGY | Facility: CLINIC | Age: 44
End: 2024-11-19

## 2024-11-19 VITALS
WEIGHT: 228.63 LBS | SYSTOLIC BLOOD PRESSURE: 131 MMHG | HEIGHT: 70 IN | HEART RATE: 83 BPM | DIASTOLIC BLOOD PRESSURE: 84 MMHG | BODY MASS INDEX: 32.73 KG/M2

## 2024-11-19 DIAGNOSIS — M70.21 OLECRANON BURSITIS OF RIGHT ELBOW: ICD-10-CM

## 2024-11-19 DIAGNOSIS — M1A.09X0 IDIOPATHIC CHRONIC GOUT OF MULTIPLE SITES WITHOUT TOPHUS: Primary | ICD-10-CM

## 2024-11-19 DIAGNOSIS — M1A.09X0 IDIOPATHIC CHRONIC GOUT OF MULTIPLE SITES WITHOUT TOPHUS: ICD-10-CM

## 2024-11-19 PROBLEM — M1A.0210 IDIOPATHIC CHRONIC GOUT OF RIGHT ELBOW WITHOUT TOPHUS: Status: ACTIVE | Noted: 2024-11-19

## 2024-11-19 PROCEDURE — 73630 X-RAY EXAM OF FOOT: CPT | Mod: TC,50

## 2024-11-19 PROCEDURE — 73630 X-RAY EXAM OF FOOT: CPT | Mod: 26,50,, | Performed by: RADIOLOGY

## 2024-11-19 PROCEDURE — G2211 COMPLEX E/M VISIT ADD ON: HCPCS | Mod: S$GLB,,, | Performed by: INTERNAL MEDICINE

## 2024-11-19 PROCEDURE — 99999 PR PBB SHADOW E&M-EST. PATIENT-LVL IV: CPT | Mod: PBBFAC,,, | Performed by: INTERNAL MEDICINE

## 2024-11-19 PROCEDURE — 99205 OFFICE O/P NEW HI 60 MIN: CPT | Mod: S$GLB,,, | Performed by: INTERNAL MEDICINE

## 2024-11-19 PROCEDURE — 73130 X-RAY EXAM OF HAND: CPT | Mod: 26,50,, | Performed by: RADIOLOGY

## 2024-11-19 PROCEDURE — 73130 X-RAY EXAM OF HAND: CPT | Mod: TC,50

## 2024-11-19 RX ORDER — TIRZEPATIDE 15 MG/.5ML
INJECTION, SOLUTION SUBCUTANEOUS
COMMUNITY
Start: 2024-11-18

## 2024-11-19 RX ORDER — TIRZEPATIDE 10 MG/.5ML
INJECTION, SOLUTION SUBCUTANEOUS
COMMUNITY
Start: 2024-06-20 | End: 2024-11-19

## 2024-11-19 RX ORDER — INDOMETHACIN 75 MG/1
75 CAPSULE, EXTENDED RELEASE ORAL
COMMUNITY
Start: 2024-11-03

## 2024-11-19 RX ORDER — SULFAMETHOXAZOLE AND TRIMETHOPRIM 400; 80 MG/1; MG/1
1 TABLET ORAL 2 TIMES DAILY
COMMUNITY
Start: 2024-07-23 | End: 2024-11-19

## 2024-11-19 RX ORDER — TIRZEPATIDE 12.5 MG/.5ML
INJECTION, SOLUTION SUBCUTANEOUS
COMMUNITY
Start: 2024-06-24 | End: 2024-11-19

## 2024-11-19 RX ORDER — ALLOPURINOL 100 MG/1
100 TABLET ORAL DAILY
Qty: 30 TABLET | Refills: 0 | Status: SHIPPED | OUTPATIENT
Start: 2024-11-19

## 2024-11-19 NOTE — PROGRESS NOTES
RHEUMATOLOGY CLINIC INITIAL VISIT    Reason for consult:-  To get treated for gout    Chief complaints, HPI, ROS, EXAM, Assessment & Plans:-  Seymour Puentes a 44 y.o. pleasant male comes in with gout.  He has gout since his 20s.  He usually gets flare in his toes and ankles.  1-2 flares a year which response to IM steroid injection.  He took allopurinol several years ago.  He has been doing well until recently when he started experiencing recurrent olecranon bursitis.  He was recently seen at sports medicine clinic and was referred here for treatment of gout.  Mild pain under right elbow today.  Usually the bursitis is much more severe on the left elbow.  He also would like to get checked for any other arthritic problems since he has activity and inflammatory joint pain affecting other small and large joints over the past few years.  No significant pain today in those joints.  No inflammatory back pain.  Rheumatological review of system negative otherwise.  Physical examination shows no synovitis, dactylitis, enthesitis or effusion.  No tophaceous deposit.  No active swelling of bursa.    1. Idiopathic chronic gout of multiple sites without tophus    2. Olecranon bursitis of right elbow      Problem List Items Addressed This Visit       Idiopathic chronic gout of right elbow without tophus - Primary    Olecranon bursitis of right elbow       Chronic gout.  Check uric acid level today along with CMP and CBC.  If uric acid above 6, start allopurinol along with colchicine for gout prophylaxis.  Check serologies for any underlying inflammatory arthritis.  Pretest probability low.  We will try Medrol Dosepak if olecranon bursitis recurs.  I have explained all of the above in detail and the patient understands all of the current recommendation(s). I have answered all questions to the best of my ability and to their complete satisfaction.         # Follow up in about 6 months (around 5/19/2025).      Past Medical History:    Diagnosis Date    Cholelithiasis     Colon polyp     Fatty liver     Gout     Hepatomegaly     History of bariatric surgery     Obesity     Tobacco use        Past Surgical History:   Procedure Laterality Date    APPENDECTOMY  ~    COLONOSCOPY N/A 2020    Procedure: COLONOSCOPY;  Surgeon: Dwaine Murphy Jr., MD;  Location: [facility];  Service: Endoscopy;  Laterality: N/A; 9 colon polyps removed, Redundant colon. hemorrhoids; repeat in 5 years for surveillance; biopsy: polyps- tubular adenoma x1, others were hyperplastic polyps    ESOPHAGOGASTRODUODENOSCOPY N/A 2020    Dr. Muprhy: Reflux vs stasis chronic esophagitis, Mild Schatzki ring (vs hypertonic LES) . Dilated, 51 Fr. sleeve gastrectomy was found, characterized by healthy appearing mucosa, single antrum polyp biopsied. antritis; biopsy: esophagus WNL, polyp-hyperplastic polyp(s), stomach- mild chronic gastritis, negative for h pylori    Gastric sleeve  2013    VASECTOMY          Social History     Tobacco Use    Smoking status: Former     Current packs/day: 0.00     Types: Cigarettes     Quit date: 2019     Years since quittin.3    Smokeless tobacco: Never   Substance Use Topics    Alcohol use: Yes     Alcohol/week: 4.0 - 5.0 standard drinks of alcohol     Types: 4 - 5 Glasses of wine per week    Drug use: Not Currently     Comment: marijuana in college        Family History   Problem Relation Name Age of Onset    Colon cancer Neg Hx      Crohn's disease Neg Hx      Esophageal cancer Neg Hx      Stomach cancer Neg Hx      Liver cancer Neg Hx      Liver disease Neg Hx         Review of patient's allergies indicates:  No Known Allergies    Medication List with Changes/Refills   Current Medications    INDOMETHACIN (INDOCIN SR) 75 MG CPSR CR CAPSULE    Take 75 mg by mouth.    PANTOPRAZOLE (PROTONIX) 40 MG TABLET    TAKE 1 TABLET BY MOUTH EVERY DAY IN THE MORNING    VALACYCLOVIR (VALTREX) 1000 MG TABLET    Take 2 tablets (2,000 mg total)  by mouth 2 (two) times daily. for 1 day    ZEPBOUND 15 MG/0.5 ML PNIJ    Inject into the skin.   Discontinued Medications    LOSARTAN (COZAAR) 25 MG TABLET    TAKE 1 TABLET BY MOUTH EVERY DAY    SULFAMETHOXAZOLE-TRIMETHOPRIM 400-80MG (BACTRIM,SEPTRA) 400-80 MG PER TABLET    Take 1 tablet by mouth 2 (two) times daily.    ZEPBOUND 10 MG/0.5 ML PNIJ    SMARTSIG:10 Milligram(s) SUB-Q Once a Week    ZEPBOUND 12.5 MG/0.5 ML PNIJ    SMARTSI.5 Milligram(s) SUB-Q Once a Week         Thank you for allowing me to participate in the care ofSeymour Park.    Disclaimer: This note was prepared using voice recognition system and is likely to have sound alike errors and is not proof read.  Please call me with any questions.        Total time spent 60 minutes. This includes face to face time and non-face to face time preparing to see the patient (eg, review of tests), obtaining and/or reviewing separately obtained history, documenting clinical information in the electronic or other health record, independently interpreting results and communicating results to the patient/family/caregiver, or care coordinator.

## 2024-11-19 NOTE — TELEPHONE ENCOUNTER
----- Message from Talib Go MD sent at 11/19/2024  2:07 PM CST -----  Uric acid above 6.  Start allopurinol and repeat uric acid level in 4 weeks.  Prescription sent to pharmacy.

## 2024-11-23 ENCOUNTER — PATIENT MESSAGE (OUTPATIENT)
Dept: RHEUMATOLOGY | Facility: CLINIC | Age: 44
End: 2024-11-23
Payer: COMMERCIAL

## 2024-12-17 DIAGNOSIS — M1A.09X0 IDIOPATHIC CHRONIC GOUT OF MULTIPLE SITES WITHOUT TOPHUS: ICD-10-CM

## 2024-12-17 RX ORDER — ALLOPURINOL 100 MG/1
TABLET ORAL
Qty: 30 TABLET | Refills: 5 | Status: SHIPPED | OUTPATIENT
Start: 2024-12-17

## 2024-12-20 ENCOUNTER — LAB VISIT (OUTPATIENT)
Dept: LAB | Facility: HOSPITAL | Age: 44
End: 2024-12-20
Attending: INTERNAL MEDICINE
Payer: COMMERCIAL

## 2024-12-20 DIAGNOSIS — M1A.09X0 IDIOPATHIC CHRONIC GOUT OF MULTIPLE SITES WITHOUT TOPHUS: ICD-10-CM

## 2024-12-20 LAB — URATE SERPL-MCNC: 6.1 MG/DL (ref 3.4–7)

## 2024-12-20 PROCEDURE — 84550 ASSAY OF BLOOD/URIC ACID: CPT | Performed by: INTERNAL MEDICINE

## 2024-12-20 PROCEDURE — 36415 COLL VENOUS BLD VENIPUNCTURE: CPT | Mod: PO | Performed by: INTERNAL MEDICINE

## 2025-02-19 DIAGNOSIS — M1A.09X0 IDIOPATHIC CHRONIC GOUT OF MULTIPLE SITES WITHOUT TOPHUS: ICD-10-CM

## 2025-02-20 RX ORDER — ALLOPURINOL 100 MG/1
100 TABLET ORAL
Qty: 90 TABLET | Refills: 1 | Status: SHIPPED | OUTPATIENT
Start: 2025-02-20

## 2025-02-24 ENCOUNTER — TELEPHONE (OUTPATIENT)
Dept: ORTHOPEDICS | Facility: CLINIC | Age: 45
End: 2025-02-24
Payer: COMMERCIAL

## 2025-02-24 NOTE — TELEPHONE ENCOUNTER
"Returned patient call and offered first available appointment in [place] and [place].  Patient elected to be scheduled on first available in [place] on March 10.  Verbalized understanding of appt date, time and location.   ----- Message from Jeremy sent at 2/24/2025  3:02 PM CST -----  Pt Requesting to Schedule an Appointment Pt is requesting to schedule an appointment that our scheduling dept cannot schedule.Who called[facility]:  [phone] pt wants appt:  "2/24/2025 - 2/25/2025 any time"Reason for appt: "My elbow is back flared up again after a short period of not hurting. I would like to get an injection in my elbow"Additional notes:  "

## 2025-03-10 ENCOUNTER — OFFICE VISIT (OUTPATIENT)
Dept: ORTHOPEDICS | Facility: CLINIC | Age: 45
End: 2025-03-10
Payer: COMMERCIAL

## 2025-03-10 VITALS — WEIGHT: 221 LBS | BODY MASS INDEX: 31.64 KG/M2 | HEIGHT: 70 IN

## 2025-03-10 DIAGNOSIS — M70.21 OLECRANON BURSITIS OF RIGHT ELBOW: Primary | ICD-10-CM

## 2025-03-10 PROCEDURE — 99999 PR PBB SHADOW E&M-EST. PATIENT-LVL III: CPT | Mod: PBBFAC,,, | Performed by: STUDENT IN AN ORGANIZED HEALTH CARE EDUCATION/TRAINING PROGRAM

## 2025-03-10 PROCEDURE — 20606 DRAIN/INJ JOINT/BURSA W/US: CPT | Mod: RT,S$GLB,, | Performed by: STUDENT IN AN ORGANIZED HEALTH CARE EDUCATION/TRAINING PROGRAM

## 2025-03-10 PROCEDURE — 99214 OFFICE O/P EST MOD 30 MIN: CPT | Mod: 25,S$GLB,, | Performed by: STUDENT IN AN ORGANIZED HEALTH CARE EDUCATION/TRAINING PROGRAM

## 2025-03-10 RX ORDER — TRIAMCINOLONE ACETONIDE 40 MG/ML
40 INJECTION, SUSPENSION INTRA-ARTICULAR; INTRAMUSCULAR
Status: DISCONTINUED | OUTPATIENT
Start: 2025-03-10 | End: 2025-03-10 | Stop reason: HOSPADM

## 2025-03-10 RX ADMIN — TRIAMCINOLONE ACETONIDE 40 MG: 40 INJECTION, SUSPENSION INTRA-ARTICULAR; INTRAMUSCULAR at 08:03

## 2025-03-10 NOTE — PROCEDURES
Intermediate Joint Aspiration/Injection: R olecranon bursa    Date/Time: 3/10/2025 8:20 AM    Performed by: Jason Juarez MD  Authorized by: Jason Juarez MD    Consent Done?:  Yes (Verbal)  Indications:  Pain  Site marked: The procedure site was marked    Timeout: Prior to procedure the correct patient, procedure, and site was verified      Location:  Elbow  Site:  R olecranon bursa  Prep: Patient was prepped and draped in usual sterile fashion    Ultrasonic Guidance for needle placement: Yes  Images are saved and documented.    Needle size:  25 G  Approach:  Posterior  Medications:  40 mg triamcinolone acetonide 40 mg/mL  Patient tolerance:  Patient tolerated the procedure well with no immediate complications       Additional Comments: Ultrasound guidance was used for needle localization. Images were saved and stored for documentation. The appropriate structures were visualized. Dynamic visualization of the needle was continuous throughout the procedures and maintained good position.

## 2025-03-10 NOTE — PROGRESS NOTES
Patient ID: Seymour Park  YOB: 1980  MRN: [id number]    Chief Complaint: Pain of the Right Elbow      History of Present Illness: Seymour Park is a right-hand dominant 44 y.o. male who presents today with right elbow pain.  Last seen in clinic on 11/15/2024.  Patient saw rheumatology and was prescribed Allupurinol and reports an improvement in pain for 1-2 weeks.  Pain returned after the 1-2 weeks and has had tenderness in the posterior elbow region.  Reports constant tenderness that becomes a stabbing pain with touch. Rates pain with touch or certain movements increases pain to a 6-7/10.  Interferes with sleep at night.  Currently using ice, heat, Ibuprofen and will take Indomethacin PRN.  Interested in discussing injection or other treatment options today.     11/15/2024 interval History of Present Illness: Seymour Park is a right-hand dominant 44 y.o. male who presents today with right elbow pain. Mr. Park has been having persistent right elbow pain for about a month. He reports he has fought with gout since in his early 20's and that this feels like a gout flare up. It's not really a pain, just a nagging ache that in constant and is real tender in the joint to touch. Worse at night. Xrays were taken today.       The patient is active in none.  Occupation: Administrative       Past Medical History:   Past Medical History:   Diagnosis Date    Cholelithiasis     Colon polyp     Fatty liver     Gout     Hepatomegaly     History of bariatric surgery     Obesity     Tobacco use      Past Surgical History:   Procedure Laterality Date    APPENDECTOMY  ~2012    COLONOSCOPY N/A 8/14/2020    Procedure: COLONOSCOPY;  Surgeon: Dwaine Murphy Jr., MD;  Location: [facility];  Service: Endoscopy;  Laterality: N/A; 9 colon polyps removed, Redundant colon. hemorrhoids; repeat in 5 years for surveillance; biopsy: polyps- tubular adenoma x1, others were hyperplastic polyps    ESOPHAGOGASTRODUODENOSCOPY N/A  8/14/2020    Dr. Murphy: Reflux vs stasis chronic esophagitis, Mild Schatzki ring (vs hypertonic LES) . Dilated, 51 Fr. sleeve gastrectomy was found, characterized by healthy appearing mucosa, single antrum polyp biopsied. antritis; biopsy: esophagus WNL, polyp-hyperplastic polyp(s), stomach- mild chronic gastritis, negative for h pylori    Gastric sleeve  01/2013    VASECTOMY       Family History   Problem Relation Name Age of Onset    Colon cancer Neg Hx      Crohn's disease Neg Hx      Esophageal cancer Neg Hx      Stomach cancer Neg Hx      Liver cancer Neg Hx      Liver disease Neg Hx       Social History[1]  Medication List with Changes/Refills   Current Medications    ALLOPURINOL (ZYLOPRIM) 100 MG TABLET    TAKE 1 TABLET BY MOUTH EVERY DAY    INDOMETHACIN (INDOCIN SR) 75 MG CPSR CR CAPSULE    Take 75 mg by mouth.    PANTOPRAZOLE (PROTONIX) 40 MG TABLET    TAKE 1 TABLET BY MOUTH EVERY DAY IN THE MORNING    VALACYCLOVIR (VALTREX) 1000 MG TABLET    Take 2 tablets (2,000 mg total) by mouth 2 (two) times daily. for 1 day    ZEPBOUND 15 MG/0.5 ML PNIJ    Inject into the skin.     Review of patient's allergies indicates:  No Known Allergies    Physical Exam:   Body mass index is 31.71 kg/m².    GENERAL: Well appearing, in no acute distress.  HEAD: Normocephalic and atraumatic.  ENT: External ears and nose grossly normal.  EYES: EOMI bilaterally  PULMONARY: Respirations are grossly even and non-labored.  NEURO: Awake, alert, and oriented x 3.  SKIN: No obvious rashes appreciated.  PSYCH: Mood & affect are appropriate.    Detailed MSK exam:     Right elbow exam:   -TTP: Olecranon  -ROM: extension 0, flexion 130  -Resisted wrist extension: negative  -Resisted 3rd finger extension: negative  -Resisted wrist flexion: negative  -Resisted pronation: negative  -Resisted supination: negative  -Sensation intact  -Pulses 2+  - strength 5/5      Imaging:  X-Ray Hand 3 View Bilateral  Narrative: EXAM:    XR HAND COMPLETE 3  VIEWS BILATERAL    CLINICAL HISTORY:   [M1A.09X0]-Idiopathic chronic gout, multiple sites, without tophus (tophi).    FINDINGS:    No acute fracture is identified.  Joint alignment is anatomic.  Bilateral second and third PIP osteoarthritis with marginal osteophytes and mild joint space loss.  No other significant arthritic change.  No erosive change identified.  Impression:   No acute findings.  Chronic findings, as above.    Finalized on: 11/19/2024 4:17 PM By:  Trey De Jesus MD  [facility]# [id number]      2024-11-19 16:19:35.353    [facility]  X-Ray Foot Complete Bilateral  Narrative: EXAM:    XR FOOT COMPLETE 3 VIEW BILATERAL    CLINICAL HISTORY:   [M1A.09X0]-Idiopathic chronic gout, multiple sites, without tophus (tophi).    FINDINGS:    No acute fracture is identified.  Joint alignment is anatomic.  Chronic flattening morphology of the bilateral dorsal navicular.  No advanced arthritic change.  No erosive change identified.  No soft tissue abnormality identified.  Impression:   No acute findings.    Finalized on: 11/19/2024 4:15 PM By:  Trey De Jesus MD  [facility]# [id number]      2024-11-19 16:17:55.175    [facility]        Relevant imaging results were reviewed and interpreted by me and per my read shows no acute abnormalities.  This was discussed with the patient and / or family today.     Assessment:  Seymour Park is a 44 y.o. male following up for right elbow pain.   Plan: Steroid injection given today (see separate procedure note for details). We discussed the proper protocols after the injection such as no submerging pools, baths tubs, or hot tubs for 24 hr.  Showering is okay today.  We also discussed that blood sugars can be elevated after an injection and asked patient to properly checked her sugars over the next few days and contact their PCP if there are any concerns.  We discussed red flags such as fevers, chills, red, warm, tender joint at the area of injection to please seek medical care immediately.   Continue conservative  management for pain. Consider advanced imaging if not improving.   Follow up as needed. All questions answered.     Olecranon bursitis of right elbow  -     Sports Medicine US - Guidance for Needle Placement  -     Intermediate Joint Aspiration/Injection: R olecranon bursa         Ultrasound guidance was used for needle localization. Images were saved and stored for documentation. The appropriate structures were visualized. Dynamic visualization of the needle was continuous throughout the procedures and maintained good position.      Electronically signed:  Jason Juarez MD, MPH  03/10/2025  8:22 AM         [1]   Social History  Socioeconomic History    Marital status:    Tobacco Use    Smoking status: Former     Current packs/day: 0.00     Types: Cigarettes     Quit date: 2019     Years since quittin.6    Smokeless tobacco: Never   Substance and Sexual Activity    Alcohol use: Yes     Alcohol/week: 4.0 - 5.0 standard drinks of alcohol     Types: 4 - 5 Glasses of wine per week    Drug use: Not Currently     Comment: marijuana in college

## 2025-03-10 NOTE — PATIENT INSTRUCTIONS
Assessment:  Seymour Park is a 44 y.o. male   Chief Complaint   Patient presents with    Right Elbow - Pain       No diagnosis found.     Plan:  Ultrasound guided cortisone injection to the olecranon bursa of right elbow  We discussed the proper protocols after the injection such as no submerging pools, baths tubs, or hot tubs for 24 hr.  Showering is okay today.  We also discussed that blood sugars can be elevated after an injection and asked patient to properly checked her sugars over the next few days and contact their PCP if there are any concerns.  We discussed red flags such as fevers, chills, red, warm, tender joint at the area of injection to please seek medical care immediately.    Follow up as needed      Follow-up: as needed.    Thank you for choosing [facility] and Dr. Jason Juarez for your orthopedic & sports medicine care. It is our goal to provide you with exceptional care that will help keep you healthy, active, and get you back in the game.    Please do not hesitate to reach out to us via email, phone, or MyChart with any questions, concerns, or feedback.    If you felt that you received exemplary care today, please consider leaving us feedback on [employer] at:  [email or web address]    If you are experiencing pain/discomfort ,or have questions after 5pm and would like to be connected to the [facility] on-call team, please call this number and specify which Sports Medicine provider is treating you: [phone]

## 2025-03-10 NOTE — PROCEDURES
Sports Medicine US - Guidance for Needle Placement    Date/Time: 3/10/2025 8:20 AM    Performed by: Jason Juarez MD  Authorized by: Jason Juarez MD  Preparation: Patient was prepped and draped in the usual sterile fashion.  Local anesthesia used: no    Anesthesia:  Local anesthesia used: no    Sedation:  Patient sedated: no    Patient tolerance: patient tolerated the procedure well with no immediate complications  Comments: Ultrasound guidance was used for needle localization. Images were saved and stored for documentation. The appropriate structures were visualized. Dynamic visualization of the needle was continuous throughout the procedures and maintained good position.

## 2025-05-03 ENCOUNTER — PATIENT MESSAGE (OUTPATIENT)
Dept: RHEUMATOLOGY | Facility: CLINIC | Age: 45
End: 2025-05-03
Payer: COMMERCIAL

## 2025-05-21 ENCOUNTER — PATIENT MESSAGE (OUTPATIENT)
Dept: RHEUMATOLOGY | Facility: CLINIC | Age: 45
End: 2025-05-21
Payer: COMMERCIAL

## 2025-05-21 NOTE — TELEPHONE ENCOUNTER
Spoke with patient and Kimberley Duval.  Informed them that I do not have a signed medical release form by patient to send to facility for Xray and MRI.  Informed facility that I will type a request and fax

## 2025-06-10 ENCOUNTER — OFFICE VISIT (OUTPATIENT)
Dept: RHEUMATOLOGY | Facility: CLINIC | Age: 45
End: 2025-06-10
Payer: COMMERCIAL

## 2025-06-10 ENCOUNTER — TELEPHONE (OUTPATIENT)
Dept: RHEUMATOLOGY | Facility: CLINIC | Age: 45
End: 2025-06-10
Payer: COMMERCIAL

## 2025-06-10 ENCOUNTER — PATIENT MESSAGE (OUTPATIENT)
Dept: RHEUMATOLOGY | Facility: CLINIC | Age: 45
End: 2025-06-10

## 2025-06-10 DIAGNOSIS — M76.61 TENDONITIS, ACHILLES, RIGHT: ICD-10-CM

## 2025-06-10 DIAGNOSIS — M1A.09X0 IDIOPATHIC CHRONIC GOUT OF MULTIPLE SITES WITHOUT TOPHUS: Primary | ICD-10-CM

## 2025-06-10 PROCEDURE — 98006 SYNCH AUDIO-VIDEO EST MOD 30: CPT | Mod: 95,,, | Performed by: INTERNAL MEDICINE

## 2025-06-10 PROCEDURE — G2211 COMPLEX E/M VISIT ADD ON: HCPCS | Mod: 95,,, | Performed by: INTERNAL MEDICINE

## 2025-06-10 RX ORDER — MELOXICAM 15 MG/1
15 TABLET ORAL DAILY
Qty: 30 TABLET | Refills: 6 | Status: SHIPPED | OUTPATIENT
Start: 2025-06-10

## 2025-06-10 NOTE — PROGRESS NOTES
RHEUMATOLOGY FOLLOW UP - TELE VISIT     The patient location is: [place]  The chief complaint leading to consultation is:  Follow-up for gout  Visit type: Virtual visit with synchronous audio and video  Total face-to-face time spent with patient:  15 minutes  Each patient to whom he or she provides medical services by telemedicine is:  (1) informed of the relationship between the physician and patient and the respective role of any other health care provider with respect to management of the patient; and (2) notified that he or she may decline to receive medical services by telemedicine and may withdraw from such care at any time.    Chief complaints, HPI, ROS, EXAM, Assessment & Plans:-  Seymour Puentes a 45 y.o. pleasant male seen today for follow-up visit.  He follows in the Rheumatology Clinic for non tophaceous gout.  On allopurinol.  Denies any gout flares since last visit.  Had 1 episode of olecranon bursitis treated by sports medicine specialist with injection and another episode of right Achillis tendonitis seen at outside hospital.  Had MRI done and was reported to have tendonitis with mild chronic old injury.  Denies any active symptoms today.  Rheumatological review of system negative otherwise.  Physical examination shows 100% fist formation bilateral hands..    1. Idiopathic chronic gout of multiple sites without tophus    2. Tendonitis, Achilles, right      Problem List Items Addressed This Visit       Idiopathic chronic gout of multiple sites without tophus - Primary    Relevant Medications    meloxicam (MOBIC) 15 MG tablet    Other Relevant Orders    Comprehensive Metabolic Panel    Uric Acid    Tendonitis, Achilles, right    Relevant Medications    meloxicam (MOBIC) 15 MG tablet     Labs reviewed today   Latest Reference Range & Units 11/19/24 13:31   Sodium 136 - 145 mmol/L 141   Potassium 3.5 - 5.1 mmol/L 4.4   Chloride 95 - 110 mmol/L 108   CO2 23 - 29 mmol/L 25   Anion Gap 8 - 16 mmol/L 8   BUN 6 -  20 mg/dL 15   Creatinine 0.5 - 1.4 mg/dL 1.1   eGFR >60 mL/min/1.73 m^2 >60   Glucose 70 - 110 mg/dL 86   Calcium 8.7 - 10.5 mg/dL 10.0   ALP 40 - 150 U/L 70   PROTEIN TOTAL 6.0 - 8.4 g/dL 7.6   Albumin 3.5 - 5.2 g/dL 4.0   Uric Acid 3.4 - 7.0 mg/dL 7.5 (H)   BILIRUBIN TOTAL 0.1 - 1.0 mg/dL 0.4   AST 10 - 40 U/L 20   ALT 10 - 44 U/L 11   CRP 0.0 - 8.2 mg/L 1.0   (H): Data is abnormally high     Latest Reference Range & Units 12/20/24 08:37   Uric Acid 3.4 - 7.0 mg/dL 6.1     Well controlled gout on allopurinol 100 mg once daily.  Repeat uric acid level and titrate dose of allopurinol if uric acid above goal which is 6.  Intermittent tendonitis and bursitis.  Recently found to have Achillis tendonitis on the right.  Try meloxicam.  We will try to obtain the MRI of the recent visit at outside hospital.  No history of tophaceous deposits.  I have explained all of the above in detail and the patient understands all of the current recommendation(s). I have answered all questions to the best of my ability and to their complete satisfaction.   # Follow up in about 6 months (around 12/10/2025).      Past Medical History:   Diagnosis Date    Cholelithiasis     Colon polyp     Fatty liver     Gout     Hepatomegaly     History of bariatric surgery     Obesity     Tobacco use        Past Surgical History:   Procedure Laterality Date    APPENDECTOMY  ~2012    COLONOSCOPY N/A 8/14/2020    Procedure: COLONOSCOPY;  Surgeon: Dwaine Murphy Jr., MD;  Location: [facility];  Service: Endoscopy;  Laterality: N/A; 9 colon polyps removed, Redundant colon. hemorrhoids; repeat in 5 years for surveillance; biopsy: polyps- tubular adenoma x1, others were hyperplastic polyps    ESOPHAGOGASTRODUODENOSCOPY N/A 8/14/2020    Dr. Murphy: Reflux vs stasis chronic esophagitis, Mild Schatzki ring (vs hypertonic LES) . Dilated, 51 Fr. sleeve gastrectomy was found, characterized by healthy appearing mucosa, single antrum polyp biopsied. antritis;  biopsy: esophagus WNL, polyp-hyperplastic polyp(s), stomach- mild chronic gastritis, negative for h pylori    Gastric sleeve  2013    VASECTOMY          Social History[1]    Family History   Problem Relation Name Age of Onset    Colon cancer Neg Hx      Crohn's disease Neg Hx      Esophageal cancer Neg Hx      Stomach cancer Neg Hx      Liver cancer Neg Hx      Liver disease Neg Hx         Review of patient's allergies indicates:  No Known Allergies    Medication List with Changes/Refills   New Medications    MELOXICAM (MOBIC) 15 MG TABLET    Take 1 tablet (15 mg total) by mouth once daily.   Current Medications    ALLOPURINOL (ZYLOPRIM) 100 MG TABLET    TAKE 1 TABLET BY MOUTH EVERY DAY    PANTOPRAZOLE (PROTONIX) 40 MG TABLET    TAKE 1 TABLET BY MOUTH EVERY DAY IN THE MORNING    ZEPBOUND 15 MG/0.5 ML PNIJ    Inject into the skin.   Discontinued Medications    INDOMETHACIN (INDOCIN SR) 75 MG CPSR CR CAPSULE    Take 75 mg by mouth.       Disclaimer: This note was prepared using voice recognition system and is likely to have sound alike errors and is not proof read.  Please call me with any questions.         [1]   Social History  Tobacco Use    Smoking status: Former     Current packs/day: 0.00     Types: Cigarettes     Quit date: 2019     Years since quittin.8    Smokeless tobacco: Never   Substance Use Topics    Alcohol use: Yes     Alcohol/week: 4.0 - 5.0 standard drinks of alcohol     Types: 4 - 5 Glasses of wine per week    Drug use: Not Currently     Comment: marijuana in college

## 2025-06-10 NOTE — TELEPHONE ENCOUNTER
Copied from CRM #[id number]. Topic: Appointments - Appointment Access  >> [place] 10, 2025 12:12 PM Stephanievonnie wrote:  Type:  Sooner Appointment Request    Caller is requesting a sooner appointment.  Caller declined first available appointment listed below.  Caller will not accept being placed on the waitlist and is requesting a message be sent to doctor.  Name of Caller:Pt  When is the first available appointment?N/a  Symptoms: Pt cancelled appt and needs to reschedule  Would the patient rather a call back or a response via [facility]? Call  Best Call Back Number:[phone]   Additional Information:

## 2025-06-10 NOTE — Clinical Note
CMP and uric acid for this visit. Repeat both with 6 month follow up. Please get external MRI of achilles tendon from [facility].

## 2025-06-11 ENCOUNTER — LAB VISIT (OUTPATIENT)
Dept: LAB | Facility: HOSPITAL | Age: 45
End: 2025-06-11
Attending: INTERNAL MEDICINE
Payer: COMMERCIAL

## 2025-06-11 ENCOUNTER — RESULTS FOLLOW-UP (OUTPATIENT)
Dept: RHEUMATOLOGY | Facility: CLINIC | Age: 45
End: 2025-06-11

## 2025-06-11 ENCOUNTER — PATIENT MESSAGE (OUTPATIENT)
Dept: RHEUMATOLOGY | Facility: CLINIC | Age: 45
End: 2025-06-11
Payer: COMMERCIAL

## 2025-06-11 DIAGNOSIS — M1A.09X0 IDIOPATHIC CHRONIC GOUT OF MULTIPLE SITES WITHOUT TOPHUS: ICD-10-CM

## 2025-06-11 LAB
ALBUMIN SERPL BCP-MCNC: 3.7 G/DL (ref 3.5–5.2)
ALP SERPL-CCNC: 69 UNIT/L (ref 40–150)
ALT SERPL W/O P-5'-P-CCNC: 13 UNIT/L (ref 10–44)
ANION GAP (OHS): 8 MMOL/L (ref 8–16)
AST SERPL-CCNC: 18 UNIT/L (ref 11–45)
BILIRUB SERPL-MCNC: 0.6 MG/DL (ref 0.1–1)
BUN SERPL-MCNC: 14 MG/DL (ref 6–20)
CALCIUM SERPL-MCNC: 9.2 MG/DL (ref 8.7–10.5)
CHLORIDE SERPL-SCNC: 105 MMOL/L (ref 95–110)
CO2 SERPL-SCNC: 26 MMOL/L (ref 23–29)
CREAT SERPL-MCNC: 0.9 MG/DL (ref 0.5–1.4)
GFR SERPLBLD CREATININE-BSD FMLA CKD-EPI: >60 ML/MIN/1.73/M2
GLUCOSE SERPL-MCNC: 89 MG/DL (ref 70–110)
POTASSIUM SERPL-SCNC: 4.1 MMOL/L (ref 3.5–5.1)
PROT SERPL-MCNC: 7.6 GM/DL (ref 6–8.4)
SODIUM SERPL-SCNC: 139 MMOL/L (ref 136–145)
URATE SERPL-MCNC: 5.4 MG/DL (ref 3.4–7)

## 2025-06-11 PROCEDURE — 36415 COLL VENOUS BLD VENIPUNCTURE: CPT | Mod: PO

## 2025-06-11 PROCEDURE — 84550 ASSAY OF BLOOD/URIC ACID: CPT

## 2025-06-11 PROCEDURE — 84295 ASSAY OF SERUM SODIUM: CPT
